# Patient Record
Sex: MALE | Race: WHITE | NOT HISPANIC OR LATINO | Employment: OTHER | URBAN - METROPOLITAN AREA
[De-identification: names, ages, dates, MRNs, and addresses within clinical notes are randomized per-mention and may not be internally consistent; named-entity substitution may affect disease eponyms.]

---

## 2018-04-10 ENCOUNTER — MEDICAL CORRESPONDENCE (OUTPATIENT)
Dept: HEALTH INFORMATION MANAGEMENT | Facility: CLINIC | Age: 73
End: 2018-04-10

## 2018-04-20 ENCOUNTER — PRE VISIT (OUTPATIENT)
Dept: NEUROLOGY | Facility: CLINIC | Age: 73
End: 2018-04-20

## 2018-04-20 NOTE — TELEPHONE ENCOUNTER
FUTURE VISIT INFORMATION      FUTURE VISIT INFORMATION:    Date: 5/2/18    Time: 4:30pm    [place] [facility] [place]  REFERRAL INFORMATION:    Referring provider:  Dr. Dong Encarnacion     Referring providers clinic:   [facility]    Reason for visit/diagnosis DBS consult for parkinsons    RECORDS REQUESTED FROM:       Clinic name Comments Records Status Imaging Status   [facility]  Received                                    RECORDS STATUS      4/20/18: referral

## 2018-04-27 ASSESSMENT — ENCOUNTER SYMPTOMS
LOSS OF CONSCIOUSNESS: 0
TINGLING: 1
NAIL CHANGES: 0
NUMBNESS: 0
DIZZINESS: 0
HEADACHES: 0
POOR WOUND HEALING: 0
PARALYSIS: 0
SEIZURES: 0
SPEECH CHANGE: 0
MEMORY LOSS: 0
SKIN CHANGES: 1
TREMORS: 1
DISTURBANCES IN COORDINATION: 0
WEAKNESS: 0

## 2018-05-01 NOTE — TELEPHONE ENCOUNTER
FUTURE VISIT INFORMATION        FUTURE VISIT INFORMATION:    Date: 05/02/2018      Time:     Location:   REFERRAL INFORMATION:    Referring provider:   Dr. Dong Encarnacion     Referring providers clinic:   [facility]    Reason for visit/diagnosis [facility]    Referring providers clinic:      Reason for visit/diagnosis      RECORDS REQUESTED FROM:       Clinic name Comments Records Status Imaging Status   [facility] Records Received 04/20/2018                                     RECORDS STATUS        NOTES (FOR ALL VISITS) STATUS DETAILS   OFFICE NOTE from referring provider Received    OFFICE NOTE from other specialist Received    DISCHARGE SUMMARY from hospital N/A    DISCHARGE REPORT from the ER N/A    OPERATIVE REPORT N/A    MEDICATION LIST Received     IMAGING  (FOR ALL VISITS)     EMG Received    EEG Received    ECT Received    MRI (HEAD, NECK, SPINE) N/A    CT (HEAD, NECK, SPINE) N/A

## 2018-05-02 ENCOUNTER — OFFICE VISIT (OUTPATIENT)
Dept: NEUROLOGY | Facility: CLINIC | Age: 73
End: 2018-05-02
Payer: COMMERCIAL

## 2018-05-02 ENCOUNTER — PRE VISIT (OUTPATIENT)
Dept: NEUROLOGY | Facility: CLINIC | Age: 73
End: 2018-05-02

## 2018-05-02 VITALS
DIASTOLIC BLOOD PRESSURE: 73 MMHG | HEART RATE: 60 BPM | HEIGHT: 68 IN | SYSTOLIC BLOOD PRESSURE: 147 MMHG | BODY MASS INDEX: 28.46 KG/M2 | WEIGHT: 187.8 LBS

## 2018-05-02 DIAGNOSIS — G20.A1 PARKINSON'S DISEASE (H): Primary | ICD-10-CM

## 2018-05-02 RX ORDER — CARBIDOPA AND LEVODOPA 25; 100 MG/1; MG/1
TABLET ORAL
Qty: 150 TABLET | Refills: 3 | Status: SHIPPED | OUTPATIENT
Start: 2018-05-02 | End: 2018-06-12

## 2018-05-02 RX ORDER — LABETALOL 100 MG/1
TABLET, FILM COATED ORAL
COMMUNITY
Start: 2017-12-12 | End: 2018-06-04

## 2018-05-02 ASSESSMENT — PAIN SCALES - GENERAL: PAINLEVEL: NO PAIN (0)

## 2018-05-02 NOTE — NURSING NOTE
Chief Complaint   Patient presents with     Consult     New movement- DBS Consultation     Nunu Landry

## 2018-05-02 NOTE — PATIENT INSTRUCTIONS
#1  Change carbidiopa/levodopa to 25/100 1 tab every 3 hours while awake  #2 Take one carbidopa/levodopa 50/200 at bedtime  #3 Start DBS clinic  #4 Attend DBS class  #5 Return to clinic

## 2018-05-02 NOTE — PROGRESS NOTES
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  Currently he is slightly under treated with his carbidopa levodopa 50/200 ER.  The normal practice would be to convert him to carbidopa levodopa 25/100 immediate release.  It is quite possible in doing so however he would experience motor fluctuations for the first time.  He is so mild that he really does not experience significant off on his current dosage.  I think it would be worthwhile to see what he can do on a regular carbidopa levodopa.  It would also help him experience a higher level of on.  Hopefully we can do this without causing dyskinesia.  I recommend that he take carbidopa/levodopa 25/100, 1 tablet every 3 hours while awake.  He should always take the medicine one half hour before meals.    #2  He is still very interested in deep brain stimulation.  He recognizes that he is older and he does not want to miss an opportunity to have the stimulation procedure.  He would like to begin the DBS workup and make a decision between medical treatment and surgical treatment at the completion of the workup.  I think this is quite reasonable.    Please call or write with questions or concerns,    Sincerely yours,    Meng Liriano MD      History of Present Illness  Mr. Antoine is a 72 year old male who is retired from information technology.  He comes with his wife.    There is no family history of Parkinson's disease or tremor.  The patient has no history of serious head injury.  He says he did have mercury exposure as a child.  15 years ago he was  exposed to Reglan and became exhausted and can barely move.  He was [place] on [place].  He is a coffee drinker.    His sense of smell remains good.  He has had longtime REM behavior disorder dating decades.    6 years ago he developed hand tremor.  It was mainly in the right handed and resting tremor.  Would also had some action components.  It now affects the left hand and the right foot.  He saw Dr. Díaz at the [facility].  Dr. Díaz diagnosed Parkinson's disease.  The patient was started on carbidopa levodopa and it was felt that it helped his tremor.  He has done well since.  He has had some slight hesitation when he walks but no clear freezing.  He has no festination.  His handwriting is small.  He does drool some into the pillow.  His speech is soft.  His walking is shuffling at times.  He is however had no falls no fainting and only minor difficulty with memory for names.    Currently he takes carbidopa/levodopa 50/200 long-acting 1 tablet 4 times a day.  He feels he may see a reduction in tremor 1 hour after taking a pill.  He does not notice any wearing off.  The medicine may decrease the tremor but it sounds as if he is never without tremor through the day.    He was in [place] with some friends.  2 of them had Parkinson's disease and had deep brain stimulation.  They recommended that he undergo the procedure.  At this is a reason for coming here today.        Past Medical History:   History reviewed. No pertinent past medical history.    Past Surgical History:   History reviewed. No pertinent surgical history.    Medications:  Current Outpatient Prescriptions   Medication Sig Dispense Refill     labetalol (NORMODYNE) 100 MG tablet        UNABLE TO FIND        AMLODIPINE BESYLATE PO Take 10 mg by mouth daily       carbidopa-levodopa (SINEMET CR)  MG per tablet Take 1 tablet by mouth 2 times daily       LOSARTAN POTASSIUM PO Take 100 mg by mouth daily       OMEPRAZOLE PO Take 20 mg by  mouth       potassium chloride (K-DUR) 10 MEQ tablet Take 10 mEq by mouth 2 times daily       triamterene-hydrochlorothiazide (MAXZIDE-25) 37.5-25 MG per tablet Take 1 tablet by mouth daily       UNABLE TO FIND 1 tablet daily MEDICATION NAME: Pam       [DISCONTINUED] carbidopa-levodopa (SINEMET CR)  MG per tablet Take 1 tablet by mouth 3 times daily            Movement Disorder-related Medications                   7 AM noon mc qhs    Carbidopa/levodopa 50/200 ER                                                1 1 1 1                                                                                Allergies: is allergic to metoclopramide and streptogramins.    Social History:   Social History     Social History     Marital status: Single     Spouse name: N/A     Number of children: N/A     Years of education: N/A     Social History Main Topics     Smoking status: Never Smoker     Smokeless tobacco: Never Used     Alcohol use None     Drug use: None     Sexual activity: Not Asked     Other Topics Concern     None     Social History Narrative       Family History:  History reviewed. No pertinent family history.    ROS:  General:  Fever : no, Chills: no, Sweats: no, Fatigue: no, ,Weight loss: no  Cardiovascular  Chest Pains: no, Palpitations: no, Edema: no, Shortness of breath: no  Respiratory  Cough: no  Gastrointestinal  Nausea: no, Vomiting: no, Diarrhea: no, Constipation: no  Genitourinary  Dysuria: no, Frequency: no, Urgency: no,  Nocturia: no, Incontinence: no Women:  Abnormal vaginal bleeding: no  Musculoskeletal  Back pain: no, Neck Pain: no  Joint pain, swelling, redness: no, Stiffness: no  Skin  Rash: no, Itching: no,  Suspicious lesions: no  Psychiatric  Depression: no, Anxiety/Panic: no  Endocrine  Cold intolerance: no, Heat intolerance: no, Excessive thirst: no  Hematologic/LymphatiAbnormal bruising, bleeding: no ,Enlarged lymph nodes: {.:[id number]  Allergic/Immunologic  Hives (Urticaria): no, HIV  exposure: no    Neurologic  Visual loss: no, Double vision: no, Headache: no, Loss of consciousness:  no, Seizure: no, Fainting (syncope): no, Dysarthria: no, Dysphagia:  no, Vertigo:  no, Weakness: no, Atrophy: no, Twitches: no, Lhermitte's: no, Numbness or tingling: no, Handwriting change: YES, Tremors: YES, Involuntary movements: no, Imbalance: no, Abnormal gait:  no, Falls :no, Memory loss: YES, RBD: YES, Sleep disorder: no, Hallucination: no, Loss of concentration: no,  Behavioral change: no,  Loss of motivation: no      Comprehensive Neurologic Exam    Mental Status Exam   The patient is alert, oriented and exhibits no difficulty with cognition or memory.  A formal short test of mental status was not performed. 32/34    Neurovascular         Speech and Language   Right Left     Carotid Bruit Absent Absent  Speech is normal.   Dorsalis Pedis Pulse Normal Normal  Description   Posterior Tibial Pulse Normal Normal  Language is normal.     Cranial Nerve Exam                 Right Left   Right Left   II                                        Normal Normal  Hearing (VIII) Normal Normal      III, IV, V!                   Normal Normal  Nystagmus (VIII) Normal Normal   EOM description                              Gag (IX, X) Normal Normal   Facial Sensation (V) Normal Normal  SCM (XI) Normal Normal   Muscles of Mastication (V) Normal Normal  Trapezius (XI) Normal Normal   Facial Strength (VII) Normal Normal  Tongue (XII) Normal Normal     Motor Exam  Strength (*/5 grading)  Muscle                  Right Left  Muscle Right Left   Frontalis                                           Normal Normal  Iliopsoas Normal    Normal          Orbicularis Oculi                     Normal Normal  Quadrideps Normal    Normal        Orbicularis Oris                         Normal Normal  Anterior Tibial Normal Normal      Deltoid Normal Normal  Gastrocnemius Normal    Normal   Biceps Normal Normal  Extensor Hallucis Longus Normal     Normal   Triceps Normal Normal  Toe Extensors Normal    Normal   EDC Normal Normal  Toe Flexor Normal    Normal   Finger Flexors Normal Normal  Other Normal Normal   First Dorsal Interosseous Normal Normal  Other Normal Normal   Hypothenar Normal Normal  Other Normal Normal   Thenar Normal Normal  Other Normal Normal     Reflexes      Tone   Right Left   Right Left   Biceps Normal Normal    Rigidity (R)     Triceps Normal Normal  Neck     Brachioradialis Normal Normal  Arm +2 +2   Quadriceps Normal Normal  Leg +1 +1   Ankle Normal Normal       Babkinski Flexor Flexor         AMR       Coordination   Right Left   Right Left   Fingers -2 -2  Finger nose finger Normal Normal   Hand -2 -1  Drift Normal Normal   Leg Normal Normal  Heel Meyer Normal Normal   Foot Normal Normal  Other     Other               Involuntary Movements    Gait and Station  None            Right Left  Stand & Sit -1   Tremor rest hands +3 +2  Gait -2   Tremor action hands +1 +1  Tandem Normal   (Movement type) Normal Normal  Romberg Absent       Sensory Exam          Right Left    Pin Normal Normal    Vibration Normal Normal    Joint position Normal Normal    Other             Coding statement:     Duration of  Services: face-to-face 80 min.   Greater than 50% of this visit was spent in counseling and coordination of care.    Medical decision making is high due to the following components:    Number of diagnoses:[id number]  ManagementDiagnostic tests orders or reviewed.  Medications were prescribed. Medications were adjusted.  Complexity of medical data:Outside records reviewed.    Took collateral history.  Risk  One or more chronic illnesses with severe exacerbation, progression, or side effects of treatment.

## 2018-05-02 NOTE — MR AVS SNAPSHOT
"              After Visit Summary   5/2/2018    Kingston Antoine    MRN: [id number]           Patient Information     Date Of Birth          1945        Visit Information        Provider Department      5/2/2018 4:30 PM Meng Liriano MD [facility] Neurology        Today's Diagnoses     Parkinson's disease (H)    -  1      Care Instructions    #1  Change carbidiopa/levodopa to 25/100 1 tab every 3 hours while awake  #2 Take one carbidopa/levodopa 50/200 at bedtime  #3 Start DBS clinic  #4 Attend DBS class  #5 Return to clinic            Follow-ups after your visit        Follow-up notes from your care team     Return in about 4 weeks (around 5/30/2018).      Who to contact     Please call your clinic at [phone] to:    Ask questions about your health    Make or cancel appointments    Discuss your medicines    Learn about your test results    Speak to your doctor            Additional Information About Your Visit        [employer] Information     [employer] gives you secure access to your electronic health record. If you see a primary care provider, you can also send messages to your care team and make appointments. If you have questions, please call your primary care clinic.  If you do not have a primary care provider, please call [phone] and they will assist you.      [employer] is an electronic gateway that provides easy, online access to your medical records. With [employer], you can request a clinic appointment, read your test results, renew a prescription or communicate with your care team.     To access your existing account, please contact your [facility] or call [phone] for assistance.        Care EveryWhere ID     This is your Care EveryWhere ID. This could be used by other organizations to access your [facility] medical records  [id number]        Your Vitals Were     Pulse Height BMI (Body Mass Index)             60 1.727 m (5' 8\") 28.55 kg/m2          Blood " Pressure from Last 3 Encounters:   05/02/18 147/73   01/27/16 114/72    Weight from Last 3 Encounters:   05/02/18 85.2 kg (187 lb 12.8 oz)   01/27/16 88.5 kg (195 lb)              Today, you had the following     No orders found for display         Today's Medication Changes          These changes are accurate as of 5/2/18  5:18 PM.  If you have any questions, ask your nurse or doctor.               These medicines have changed or have updated prescriptions.        Dose/Directions    * carbidopa-levodopa  MG per CR tablet   Commonly known as:  SINEMET CR   This may have changed:    - Another medication with the same name was added. Make sure you understand how and when to take each.  - Another medication with the same name was removed. Continue taking this medication, and follow the directions you see here.   Changed by:  Meng Liriano MD        Dose:  1 tablet   Take 1 tablet by mouth At Bedtime   Refills:  0       * carbidopa-levodopa  MG per tablet   Commonly known as:  SINEMET   This may have changed:  You were already taking a medication with the same name, and this prescription was added. Make sure you understand how and when to take each.   Used for:  Parkinson's disease (H)   Replaces:  carbidopa-levodopa  MG per CR tablet   Changed by:  Meng Liriano MD        Take one tablet every 3 hours while awake.   Quantity:  150 tablet   Refills:  3       * Notice:  This list has 2 medication(s) that are the same as other medications prescribed for you. Read the directions carefully, and ask your doctor or other care provider to review them with you.      Stop taking these medicines if you haven't already. Please contact your care team if you have questions.     aspirin 80 MG Supp Suppository   Stopped by:  Meng Liriano MD           atovaquone-proguanil 250-100 MG per tablet   Commonly known as:  MALARONE   Stopped by:  Meng Liriano MD           carbidopa-levodopa  MG per  CR tablet   Commonly known as:  SINEMET CR   Replaced by:  carbidopa-levodopa  MG per tablet   You also have another medication with the same name that you need to continue taking as instructed.   Stopped by:  Meng Liriano MD                Where to get your medicines      These medications were sent [id number] - [place], [place] - [address]  [address] [address]     Phone:  [phone]     carbidopa-levodopa  MG per tablet                Primary Care Provider Fax #    Physician No Ref-Primary [phone]       No address on file        Equal Access to Services     [facility]: Hadii bebo gaston hadbenny Somakenna, waaxda luqadaha, qaybta kaalmagerardo kelsey, amy medley . So [facility] [phone].    ATENCIÓN: Si habla español, tiene a manzanares disposición servicios gratuitos de asistencia lingüística. [facility] [phone].    We comply with applicable federal civil rights laws and [place] laws. We do not discriminate on the basis of race, color, national origin, age, disability, sex, sexual orientation, or gender identity.            Thank you!     Thank you for choosing [facility] NEUROLOGY  for your care. Our goal is always to provide you with excellent care. Hearing back from our patients is one way we can continue to improve our services. Please take a few minutes to complete the written survey that you may receive in the mail after your visit with us. Thank you!             Your Updated Medication List - Protect others around you: Learn how to safely use, store and throw away your medicines at www.disposemymeds.org.          This list is accurate as of 5/2/18  5:18 PM.  Always use your most recent med list.                   Brand Name Dispense Instructions for use Diagnosis    AMLODIPINE BESYLATE PO      Take 10 mg by mouth daily        * carbidopa-levodopa  MG per CR tablet    SINEMET CR     Take 1 tablet by mouth At Bedtime        *  carbidopa-levodopa  MG per tablet    SINEMET    150 tablet    Take one tablet every 3 hours while awake.    Parkinson's disease (H)       labetalol 100 MG tablet    NORMODYNE          LOSARTAN POTASSIUM PO      Take 100 mg by mouth daily        OMEPRAZOLE PO      Take 20 mg by mouth        potassium chloride 10 MEQ tablet    K-TAB,KLOR-CON     Take 10 mEq by mouth 2 times daily        triamterene-hydrochlorothiazide 37.5-25 MG per tablet    MAXZIDE-25     Take 1 tablet by mouth daily        UNABLE TO FIND      1 tablet daily MEDICATION NAME: Pam        UNABLE TO FIND           * Notice:  This list has 2 medication(s) that are the same as other medications prescribed for you. Read the directions carefully, and ask your doctor or other care provider to review them with you.

## 2018-05-02 NOTE — LETTER
2018       RE: Kingston Antoine  [address] [facility] [address]     Dear Colleague,    Thank you for referring your patient, Kingston Antoine, to the [facility] NEUROLOGY at [facility]. Please see a copy of my visit note below.    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  Currently he is slightly under treated with his carbidopa levodopa 50/200 ER.  The normal practice would be to convert him to carbidopa levodopa 25/100 immediate release.  It is quite possible in doing so however he would experience motor fluctuations for the first time.  He is so mild that he really does not experience significant off on his current dosage.  I think it would be worthwhile to see what he can do on a regular carbidopa levodopa.  It would also help him experience a higher level of on.  Hopefully we can do this without causing dyskinesia.  I recommend that he take carbidopa/levodopa 25/100, 1 tablet every 3 hours while awake.  He should always take the medicine one half hour before meals.    #2  He is still very interested in deep brain stimulation.  He recognizes that he is older and he does not want to miss an opportunity to have the stimulation procedure.  He would like to begin the DBS workup and make a decision between medical treatment and surgical treatment at the completion of the workup.  I think this is quite reasonable.    Please call or write with questions or concerns,    Sincerely yours,    Meng Liriano MD      History of Present Illness  Mr. 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  He comes with his wife.    There is no family history of Parkinson's disease or tremor.  The patient has no history of serious head injury.  He says he did have mercury exposure as a child.  15 years ago he was exposed to Reglan and became exhausted and can barely move.  He was [place] on [place].  He is a coffee drinker.    His sense of smell remains good.  He has had longtime REM behavior disorder dating decades.    6 years ago he developed hand tremor.  It was mainly in the right handed and resting tremor.  Would also had some action components.  It now affects the left hand and the right foot.  He saw Dr. Díaz at the [facility].  Dr. Díaz diagnosed Parkinson's disease.  The patient was started on carbidopa levodopa and it was felt that it helped his tremor.  He has done well since.  He has had some slight hesitation when he walks but no clear freezing.  He has no festination.  His handwriting is small.  He does drool some into the pillow.  His speech is soft.  His walking is shuffling at times.  He is however had no falls no fainting and only minor difficulty with memory for names.    Currently he takes carbidopa/levodopa 50/200 long-acting 1 tablet 4 times a day.  He feels he may see a reduction in tremor 1 hour after taking a pill.  He does not notice any wearing off.  The medicine may decrease the tremor but it sounds as if he is never without tremor through the day.    He was in [place] with some friends.  2 of them had Parkinson's disease and had deep brain stimulation.  They recommended that he undergo the procedure.  At this is a reason for coming here today.        Past Medical History:   History reviewed. No pertinent past medical history.    Past Surgical History:   History reviewed. No pertinent surgical history.    Medications:  Current Outpatient Prescriptions   Medication Sig Dispense Refill     labetalol (NORMODYNE) 100  MG tablet        UNABLE TO FIND        AMLODIPINE BESYLATE PO Take 10 mg by mouth daily       carbidopa-levodopa (SINEMET CR)  MG per tablet Take 1 tablet by mouth 2 times daily       LOSARTAN POTASSIUM PO Take 100 mg by mouth daily       OMEPRAZOLE PO Take 20 mg by mouth       potassium chloride (K-DUR) 10 MEQ tablet Take 10 mEq by mouth 2 times daily       triamterene-hydrochlorothiazide (MAXZIDE-25) 37.5-25 MG per tablet Take 1 tablet by mouth daily       UNABLE TO FIND 1 tablet daily MEDICATION NAME: Pam       [DISCONTINUED] carbidopa-levodopa (SINEMET CR)  MG per tablet Take 1 tablet by mouth 3 times daily            Movement Disorder-related Medications                   7 AM noon mc qhs    Carbidopa/levodopa 50/200 ER                                                1 1 1 1                                                                                Allergies: is allergic to metoclopramide and streptogramins.    Social History:   Social History     Social History     Marital status: Single     Spouse name: N/A     Number of children: N/A     Years of education: N/A     Social History Main Topics     Smoking status: Never Smoker     Smokeless tobacco: Never Used     Alcohol use None     Drug use: None     Sexual activity: Not Asked     Other Topics Concern     None     Social History Narrative       Family History:  History reviewed. No pertinent family history.    ROS:  General:  Fever : no, Chills: no, Sweats: no, Fatigue: no, ,Weight loss: no  Cardiovascular  Chest Pains: no, Palpitations: no, Edema: no, Shortness of breath: no  Respiratory  Cough: no  Gastrointestinal  Nausea: no, Vomiting: no, Diarrhea: no, Constipation: no  Genitourinary  Dysuria: no, Frequency: no, Urgency: no,  Nocturia: no, Incontinence: no Women:  Abnormal vaginal bleeding: no  Musculoskeletal  Back pain: no, Neck Pain: no  Joint pain, swelling, redness: no, Stiffness: no  Skin  Rash: no, Itching: no,  Suspicious  lesions: no  Psychiatric  Depression: no, Anxiety/Panic: no  Endocrine  Cold intolerance: no, Heat intolerance: no, Excessive thirst: no  Hematologic/LymphatiAbnormal bruising, bleeding: no ,Enlarged lymph nodes: {.:[id number]  Allergic/Immunologic  Hives (Urticaria): no, HIV exposure: no    Neurologic  Visual loss: no, Double vision: no, Headache: no, Loss of consciousness:  no, Seizure: no, Fainting (syncope): no, Dysarthria: no, Dysphagia:  no, Vertigo:  no, Weakness: no, Atrophy: no, Twitches: no, Lhermitte's: no, Numbness or tingling: no, Handwriting change: YES, Tremors: YES, Involuntary movements: no, Imbalance: no, Abnormal gait:  no, Falls :no, Memory loss: YES, RBD: YES, Sleep disorder: no, Hallucination: no, Loss of concentration: no,  Behavioral change: no,  Loss of motivation: no      Comprehensive Neurologic Exam    Mental Status Exam   The patient is alert, oriented and exhibits no difficulty with cognition or memory.  A formal short test of mental status was not performed. 32/34    Neurovascular         Speech and Language   Right Left     Carotid Bruit Absent Absent  Speech is normal.   Dorsalis Pedis Pulse Normal Normal  Description   Posterior Tibial Pulse Normal Normal  Language is normal.     Cranial Nerve Exam                 Right Left   Right Left   II                                        Normal Normal  Hearing (VIII) Normal Normal      III, IV, V!                   Normal Normal  Nystagmus (VIII) Normal Normal   EOM description                              Gag (IX, X) Normal Normal   Facial Sensation (V) Normal Normal  SCM (XI) Normal Normal   Muscles of Mastication (V) Normal Normal  Trapezius (XI) Normal Normal   Facial Strength (VII) Normal Normal  Tongue (XII) Normal Normal     Motor Exam  Strength (*/5 grading)  Muscle                  Right Left  Muscle Right Left   Frontalis                                           Normal Normal  Iliopsoas Normal    Normal          Orbicularis Oculi                      Normal Normal  Quadrideps Normal    Normal        Orbicularis Oris                         Normal Normal  Anterior Tibial Normal Normal      Deltoid Normal Normal  Gastrocnemius Normal    Normal   Biceps Normal Normal  Extensor Hallucis Longus Normal    Normal   Triceps Normal Normal  Toe Extensors Normal    Normal   EDC Normal Normal  Toe Flexor Normal    Normal   Finger Flexors Normal Normal  Other Normal Normal   First Dorsal Interosseous Normal Normal  Other Normal Normal   Hypothenar Normal Normal  Other Normal Normal   Thenar Normal Normal  Other Normal Normal     Reflexes      Tone   Right Left   Right Left   Biceps Normal Normal    Rigidity (R)     Triceps Normal Normal  Neck     Brachioradialis Normal Normal  Arm +2 +2   Quadriceps Normal Normal  Leg +1 +1   Ankle Normal Normal       Babkinski Flexor Flexor         AMR       Coordination   Right Left   Right Left   Fingers -2 -2  Finger nose finger Normal Normal   Hand -2 -1  Drift Normal Normal   Leg Normal Normal  Heel Meyer Normal Normal   Foot Normal Normal  Other     Other               Involuntary Movements    Gait and Station  None            Right Left  Stand & Sit -1   Tremor rest hands +3 +2  Gait -2   Tremor action hands +1 +1  Tandem Normal   (Movement type) Normal Normal  Romberg Absent       Sensory Exam          Right Left    Pin Normal Normal    Vibration Normal Normal    Joint position Normal Normal    Other             Coding statement:     Duration of  Services: face-to-face 80 min.   Greater than 50% of this visit was spent in counseling and coordination of care.    Medical decision making is high due to the following components:    Number of diagnoses:[id number]  ManagementDiagnostic tests orders or reviewed.  Medications were prescribed. Medications were adjusted.  Complexity of medical data:Outside records reviewed.    Took collateral history.  Risk  One or more chronic illnesses with severe exacerbation,  progression, or side effects of treatment.        Again, thank you for allowing me to participate in the care of your patient.      Sincerely,    Meng Liriano MD

## 2018-05-03 ENCOUNTER — TELEPHONE (OUTPATIENT)
Dept: NEUROSURGERY | Facility: CLINIC | Age: 73
End: 2018-05-03

## 2018-05-03 ENCOUNTER — TELEPHONE (OUTPATIENT)
Dept: NEUROLOGY | Facility: CLINIC | Age: 73
End: 2018-05-03

## 2018-05-03 NOTE — TELEPHONE ENCOUNTER
[facility] Call Center    Phone Message    May a detailed message be left on voicemail: yes    Reason for Call: Other: Pt trying to get a hold of Amanda Garvey RN regarding DBS class. Amanda documented that she left her direct number in a voicemail but pt was unable to find the number. Pt would like a call back.     Action Taken: Message routed to:  [facility] neurology

## 2018-05-04 ENCOUNTER — TELEPHONE (OUTPATIENT)
Dept: NEUROSURGERY | Facility: CLINIC | Age: 73
End: 2018-05-04

## 2018-05-04 DIAGNOSIS — G20.A1 PARKINSON'S DISEASE (H): Primary | ICD-10-CM

## 2018-05-04 NOTE — TELEPHONE ENCOUNTER
Spoke with pt about the 4 DBS workup appointments. Described each appointment as well as the DBS class. Patient would like to complete the workup as soon as possible and it is not a high priority for him to double up appointments if doing so will delay the workup.     He is planning an [place] trip either June 12-23 or June 24-30 and will be in [place] for a good part of July. However, he may be able to attend the July 18 class. I will add him to my July and August class schedules and he can choose which he would like to attend. I explained that attending in August won't impact his surgery date if he is a candidate.     Pt indicates he had an intestinal staple placed when he last had a colonoscopy. He thinks it was taken out shortly after the procedure, but said his GI MD will want him to have an xray to verify this prior to any MRI. He will f/u with the GI provider about this.     I will send pt a letter with proposed appt dates/times as well as class information and we will talk again next week. No further questions at this time.

## 2018-05-04 NOTE — TELEPHONE ENCOUNTER
[facility] Call Center    Phone Message    May a detailed message be left on voicemail: yes    Reason for Call: Other: Pt returning call. Please give him a call back, he said it may take him a minute to get to the phone.      Action Taken: Message routed to:  [facility] Neurology

## 2018-05-10 ENCOUNTER — TELEPHONE (OUTPATIENT)
Dept: NEUROSURGERY | Facility: CLINIC | Age: 73
End: 2018-05-10

## 2018-05-10 NOTE — TELEPHONE ENCOUNTER
"Pt is currently scheduled for DBS workup appointments. He and his partner were both on the line during the call. They wanted to know if the workup appointments are covered by insurance. I explained that I cannot answer specific questions about their responsibilities including co-payments and deductibles; I am happy to refer them to Joslyn Ramirez, our financial counselor. They would like to speak with her (I sent her a message and she in turn left a VM at the time of this note).     They had additional questions about the DBS class, which I answered. Pt's partner expressed concern that they are \"putting the cart before the horse\" in attending the class after the workup appointments are already complete. Unfortunately, I don't have any openings in the June class and they are unable to come until August. The class does cover the purpose of each workup appointment, but goes well beyond that, so I still think it will help them determine if DBS is a therapy that pt wants to pursue. They will also have the opportunity to ask questions about the procedure when pt meets with Dr. Gama.     Per their request, I sent a new letter re the August DBS class. I also included a flyer about the [facility] Parkinson's Symposium taking place on June 9.     Pt has my direct number and is welcome to call with any questions.   "

## 2018-05-11 ENCOUNTER — TELEPHONE (OUTPATIENT)
Dept: NEUROSURGERY | Facility: CLINIC | Age: 73
End: 2018-05-11

## 2018-05-11 ENCOUNTER — TRANSFERRED RECORDS (OUTPATIENT)
Dept: HEALTH INFORMATION MANAGEMENT | Facility: CLINIC | Age: 73
End: 2018-05-11

## 2018-05-11 NOTE — TELEPHONE ENCOUNTER
Pt called b/c he is scheduled for 3T brain MRI on 5/21/18. He had an abdominal xray to determine if a clip that was placed during his last colonoscopy is still there. He let me know that the clip is still there and he is now following up with the provider who did the colonoscopy to determine what material the clip is made of. He will keep me apprised. He has my fax number and my direct phone number. He will ask his providers to fax me any pertinent information. No further questions/concerns.

## 2018-05-14 ENCOUNTER — TELEPHONE (OUTPATIENT)
Dept: NEUROSURGERY | Facility: CLINIC | Age: 73
End: 2018-05-14

## 2018-05-14 NOTE — TELEPHONE ENCOUNTER
Report sent to scanning. Faxed to  MRI techs at [phone] and notified them by phone of pt's upcoming MRI in light of the report. Report indicates there is a metallic cholecystectomy clip in the abdomen.

## 2018-05-15 ENCOUNTER — OFFICE VISIT (OUTPATIENT)
Dept: NEUROPSYCHOLOGY | Facility: CLINIC | Age: 73
End: 2018-05-15
Payer: COMMERCIAL

## 2018-05-15 DIAGNOSIS — F09 MENTAL DISORDER DUE TO GENERAL MEDICAL CONDITION: ICD-10-CM

## 2018-05-15 DIAGNOSIS — G20.A1 PARKINSON'S DISEASE (H): Primary | ICD-10-CM

## 2018-05-15 NOTE — NURSING NOTE
The patient was seen for neuropsychological evaluation at the request of Dr. Meng Liriano for the purpose of diagnostic clarification and treatment planning.  3 hours of face to face testing were provided by this writer.  Please see Dr. Mackenzie Thornton's report for a full interpretation of the findings.

## 2018-05-15 NOTE — MR AVS SNAPSHOT
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 [place])    [address]  [address] [facility] [address] [phone]              Who to contact     Please call your clinic at [phone] to:    Ask questions about your health    Make or cancel appointments    Discuss your medicines    Learn about your test results    Speak to your doctor            Additional Information About Your Visit        MyChart Information     SNAPP' gives you secure access to your electronic health record. If you see a primary care provider, you can also send messages to your care team and make appointments. If you have questions, please call your primary care clinic.  If you do not have a primary care provider, please call [phone] and they will assist you.      SNAPP' is an electronic gateway that provides easy, online access to your medical records. With SNAPP', you can request a clinic appointment, read your test results, renew a prescription or communicate with your care team.     To access your existing account, please contact your [facility] or call [phone] for assistance.        Care EveryWhere ID     This is your Care EveryWhere ID. This could be used by other organizations to access your [place] medical records  [id number]         Blood Pressure from Last 3 Encounters:   05/21/18  121/62   05/02/18 147/73   01/27/16 114/72    Weight from Last 3 Encounters:   05/21/18 85.1 kg (187 lb 11.2 oz)   05/02/18 85.2 kg (187 lb 12.8 oz)   01/27/16 88.5 kg (195 lb)              We Performed the Following     [id number] TESTING, PER HR/PSYCHOLOGIST     NEUROPSYCH TESTING BY [facility]        Primary Care Provider Fax #    Physician No Ref-Primary [phone]       No address on file        Equal Access to Services     LENNOX WILBURN : Hadii aad ku hadasho Soomaali, waaxda luqadaha, qaybta kaalmada adeegyada, amy medley . So [facility] [phone].    ATENCIÓN: Si habla español, tiene a manzanares disposición servicios gratuitos de asistencia lingüística. Llame al [phone].    We comply with applicable federal civil rights laws and [place] laws. We do not discriminate on the basis of race, color, national origin, age, disability, sex, sexual orientation, or gender identity.            Thank you!     Thank you for choosing [facility] NEUROPSYCHOLOGY  for your care. Our goal is always to provide you with excellent care. Hearing back from our patients is one way we can continue to improve our services. Please take a few minutes to complete the written survey that you may receive in the mail after your visit with us. Thank you!             Your Updated Medication List - Protect others around you: Learn how to safely use, store and throw away your medicines at www.disposemymeds.org.          This list is accurate as of 5/15/18 11:59 PM.  Always use your most recent med list.                   Brand Name Dispense Instructions for use Diagnosis    AMLODIPINE BESYLATE PO      Take 10 mg by mouth daily        * carbidopa-levodopa  MG per CR tablet    SINEMET CR     Take 1 tablet by mouth At Bedtime        * carbidopa-levodopa  MG per tablet    SINEMET    150 tablet    Take one tablet every 3 hours while awake.    Parkinson's disease (H)       labetalol 100 MG tablet    NORMODYNE           LOSARTAN POTASSIUM PO      Take 100 mg by mouth daily        OMEPRAZOLE PO      Take 20 mg by mouth        potassium chloride 10 MEQ tablet    K-TAB,KLOR-CON     Take 10 mEq by mouth 2 times daily        triamterene-hydrochlorothiazide 37.5-25 MG per tablet    MAXZIDE-25     Take 1 tablet by mouth daily        UNABLE TO FIND      1 tablet daily MEDICATION NAME: Pam        UNABLE TO FIND           * Notice:  This list has 2 medication(s) that are the same as other medications prescribed for you. Read the directions carefully, and ask your doctor or other care provider to review them with you.

## 2018-05-21 ENCOUNTER — OFFICE VISIT (OUTPATIENT)
Dept: NEUROSURGERY | Facility: CLINIC | Age: 73
End: 2018-05-21
Payer: COMMERCIAL

## 2018-05-21 ENCOUNTER — RADIANT APPOINTMENT (OUTPATIENT)
Dept: MRI IMAGING | Facility: CLINIC | Age: 73
End: 2018-05-21
Attending: PSYCHIATRY & NEUROLOGY
Payer: COMMERCIAL

## 2018-05-21 VITALS
OXYGEN SATURATION: 96 % | SYSTOLIC BLOOD PRESSURE: 121 MMHG | HEIGHT: 68 IN | WEIGHT: 187.7 LBS | BODY MASS INDEX: 28.45 KG/M2 | HEART RATE: 63 BPM | DIASTOLIC BLOOD PRESSURE: 62 MMHG

## 2018-05-21 DIAGNOSIS — G20.A1 PARKINSON'S DISEASE (H): ICD-10-CM

## 2018-05-21 DIAGNOSIS — G20.A1 PARKINSON'S DISEASE (H): Primary | ICD-10-CM

## 2018-05-21 RX ORDER — GADOBUTROL 604.72 MG/ML
8.5 INJECTION INTRAVENOUS ONCE
Status: COMPLETED | OUTPATIENT
Start: 2018-05-21 | End: 2018-05-21

## 2018-05-21 RX ADMIN — GADOBUTROL 8.5 ML: 604.72 INJECTION INTRAVENOUS at 11:38

## 2018-05-21 ASSESSMENT — PAIN SCALES - GENERAL: PAINLEVEL: NO PAIN (0)

## 2018-05-21 NOTE — PROGRESS NOTES
.  HISTORY AND PHYSICAL EXAM    Chief Complaint   Patient presents with     Consult     Deep Brain Stimulation candidacy evaluation; Parkinson's disease       HISTORY OF PRESENT ILLNESS  We saw Mr. Kingston Antoine today in [facility]surgery [facility] as part of his work-up for Deep Brain Stimulation candidacy evaluation.  He is a 72 year old man with Parkinson s disease, diagnosed about six years ago, who developed tremor in his right hand about seven years ago.  His other symptoms are micrographia and constipation.  He also reported dream enactment behavior preceding symptom onset by 13 years.  He does not report any problems with balance or falls.  His walking has slowed down and he reports two episodes of gait freezing within the last couple of months in a narrow location.  He does not report dyskinesia.  This has since progressed and now involves his right foot and left hand, though his right hand remains more severe.  He is currently taking Sinemet every three hours.  He has had his neuropsychiatric evaluation with Dr. Thornton.  His goal for DBS is to manage his tremor.  He hopes that DBS can also help him with his gait.  He is concerned that if he waits for DBS until he really needs it, he may be too old to have the surgery.  He would like his right body tremor/symptoms addressed.  He is right hand dominant.      Past Medical History:   Diagnosis Date     CKD (chronic kidney disease) stage 3, GFR 30-59 ml/min      Diabetes type 2, controlled (H)      HTN (hypertension)      Migraine      Parkinson disease (H)        Past Surgical History:   Procedure Laterality Date     ------------OTHER-------------      anal fistula repair     ARTHROSCOPY KNEE       cholecystectomy       HERNIA REPAIR       TONSILLECTOMY & ADENOIDECTOMY       TUNA         History reviewed. No pertinent family history.    Social History     Social History     Marital status: Single     Spouse name: N/A     Number of children: N/A     Years of  education: N/A     Occupational History     Not on file.     Social History Main Topics     Smoking status: Never Smoker     Smokeless tobacco: Never Used     Alcohol use Not on file     Drug use: Not on file     Sexual activity: Not on file     Other Topics Concern     Not on file     Social History Narrative          Allergies   Allergen Reactions     Metoclopramide      Streptogramins      PN: streptomyacins     Streptomycin Hives     No Clinical Screening - See Comments Anxiety     Other reaction(s): Sedation       Current Outpatient Prescriptions   Medication     carbidopa-levodopa (SINEMET CR)  MG per tablet     triamterene-hydrochlorothiazide (MAXZIDE-25) 37.5-25 MG per tablet     [DISCONTINUED] carbidopa-levodopa (SINEMET)  MG per tablet     amLODIPine (NORVASC) 5 MG tablet     carbidopa-levodopa (SINEMET)  MG per tablet     fa-pyridoxine-cyancobalamin (FOLBIC) 2.5-25-2 MG TABS per tablet     labetalol (NORMODYNE) 100 MG tablet     losartan (COZAAR) 100 MG tablet     omeprazole (PRILOSEC) 20 MG CR capsule     potassium chloride (K-TAB,KLOR-CON) 10 MEQ tablet     triamcinolone (KENALOG) 0.5 % cream     No current facility-administered medications for this visit.        REVIEW OF SYSTEMS:  General: Negative for chills/sweats/fever, difficulty sleeping, headache, recent fatigue, or weight gain/loss.  Eyes: Negative for blurred vision, crossed eyes, double vision, recent eye infections, vision flashes, or vision halos.  Ears/Nose/Mouth/Throat: POSITIVE for ear discharge and hoarseness. Negative for bleeding gums, difficulty swallowing, earache, ear discharge, hearing loss, hoarseness, nosebleeds, tinnitus, or sinus problems.  Respiratory:Negative for chronic cough, coughing blood, night sweats, shortness of breath, Tuberculosis, or wheezing.  Cardiovascular: Negative for chest pain, dyspnea at night, heart murmur, palpitations, pacemaker, pacemaker, poor circulation, swollen legs/feet, or  "varicose veins.  Gastrointestinal: Negative for melena, hematochezia, chronic diarrhea, heartburn, Hepatitis A/B/C, increasing constipation, Liver Disease, nausea, or vomiting.   Genitourinary: Negative for Urinary retention, genital discharge, urinary incontinence, prostate problems, urgency, or UTI.   Neurological: POSITIVE for tingling in his hands. Negative for syncope, headaches, numbness of arms/legs, tingling in legs, memory problems, or seizures.  Psychological: Negative for anxiety, depression, panic attacks, or restlessness.  Skin: Negative for chronic skin itching, color changes in hand/feet when cold, poor scarring, non-healing ulcers, skin rashes/hives, unusual moles.  Musculoskeletal: Negative for arthritis, joint swelling in hands/wrists/hips/knees/joints, muscle tenderness in arms/legs, or osteoporosis.  Endocrine: POSITIVE for loss of libido. Negative for excessive thirst/hunger, intolerance for warm rooms, multiple broken bones, rapid weight gain/loss, galactorrhea, or thyroid issues.  Hematologic/Lymphatic: Negative for easy skin bruising, significant fatigue, prolonged bleeding, tender glands/lymph nodes.  Allergies: Negative for asthma or hay fever.      PHYSICAL EXAM  /62 (BP Location: Left arm, Patient Position: Sitting, Cuff Size: Adult Regular)  Pulse 63  Ht 1.727 m (5' 8\")  Wt 85.1 kg (187 lb 11.2 oz)  SpO2 96%  BMI 28.54 kg/m2    General: Awake, alert, oriented. Well nourished, well developed, no acute distress.  HEENT: Head normocephalic, atraumatic. No carotid bruit. Neck supple. Good range of motion. No palpable thyroid mass.  Heart: Regular rhythm and rate. No murmurs.  Lungs: Clear to auscultation and percussion bilaterally. No rhonchi, rales or wheeze.  Abdomen: Soft, non-tender, non-distended. No hepatosplenomegaly.  Extremity: Warm with no clubbing or cyanosis. No lower extremity edema.    Neurological:  Awake, alert and oriented to date, time, place and person. Speech " fluent.   Pupils equal, round, reactive to light.  Extraocular movement intact.  Visual fields are full on confrontation.  Hearing is grossly normal to finger rub. Bilateral hearing aids.    Facial sensation intact.  Face symmetric.  Tongue midline.  Uvula elevates equally.    Motor: full strength throughout.  Sensation: intact to light touch and pinpoint.  Deep tendon reflexes: Trace throughout. Negative for clonus. Negative for Cade's sign. No dysmetria.    Resting tremor in bilateral hands, right worse than left.      IMAGING  MRI brain without/with gadolinium 5/21/2018.  The cerebral atrophy with subdural space increase is noted.  His ventricles are moderate in size.    Impression:   1. Mild diffuse generalized cerebral and cerebellar atrophy.  2. Nonspecific scattered T2 hyperintensities. These may represent simply age-related changes versus sequela of mild chronic small vessel ischemic disease.  3. 2 punctate tiny foci of microhemorrhages in the subcortical white matter.        ASSESSMENT   72 year old right handed male with a history of Parkinson's disease.  Right hand tremor is the worst.    His neuropsych evaluation was completed last week with Dr. Thornton and he had his MRI of the brain today.  His MRI of the brain is somewhat concerning for atrophy and moderate size ventricles.  Targeting may be difficult due to the ventricle size and atrophy will increase the risk of hemorrhage and brain shift during surgery.    He is still scheduled to undergo ON/OFF testing.  Once he undergoes ON/OFF motor testing, his case will be ready to be discussed.    With regard to surgery scheduling, should he be considered a candidate for DBS, he and his wife are planning a vacation in [place] from February-March.  He would like his surgery to be done in a time-frame that would allow for his final programming to be complete by February.     He had detailed and numerous questions regarding DBS, devices and the surgical  procedure.  We spent additional time beyond the scheduled clinic visit to discuss all of his questions and concerns.    During today's visit, we discussed both phase I and II of DBS surgery.  We discussed that during Phase I, we would place the DBS electrode on the left side under MAC and microelectrode recording.  He would then return one week later for the phase II with placement of the DBS generator/battery over the left chest wall under general anesthesia.  If he is a unilateral candidate or wait and see strategy candidate, then he will undergo another evaluation/discussion prior to proceeding to the right side implantation.  If he is a bilateral candidate in a staged fashion, he would return three weeks later for the phase I and II combined for the placement of the DBS electrode on the right side under MAC and microelectrode recording followed by connection to the previously implanted generator/battery.     Briefly, following topic was discussed.    [employer]  MRI compatible.  Non-rechargeable and rechargeable generator/battery available.  4 contact electrode.  Communication method: have the  or patient controller on the skin directly over the generator/battery.    [employer]  Not yet MRI compatible.  Will become MRI compatible with retro-approval when FDA approves the device for MRI use.  Non-rechargeable generator/battery.  9 contact segmented/directional electrode.  Communication method: Wireless/bluetooth.    [employer]  Not MRI compatible.  Working on generator/battery that will be MRI compatible.  If patient gets an implant and needs an MRI in the future, when the device becomes available, patient can have the upgrade.  Rechargeable generator/battery.  8 contact electrode.  Independent current control at each contacts.  Communication method: Wireless.    I did discuss that the implant technique for these devices are relatively the same which was discussed again.  They all have similar risks  associated with the surgery.     Risks, benefits, alternative therapies were discussed with the patient, including but not limited to infection and bleeding (intracranial), injury to the brain, stroke, death, hardware failure and possible need for more surgeries.  Surgical procedure was discussed in detail.  I also discussed possible use of ÁNGEL for neuronavigation.  All questions were answered, and he expressed understanding.     A full history and physical exam was performed during today's visit.  His case will be discussed at the Movement Disorder Consensus Group Meeting to determine whether he is a good candidate for DBS surgery.      PLAN:  1. We will discuss his case at the Movement Disorder Consensus Group Meeting, and we will contact him regarding his DBS candidacy.       I, Vinod Byrnes, am serving as a scribe to document services personally performed by Froilan Gama MD, PhD, based upon my observations and the provider's statements to me. All documentation has been reviewed and edited by the aforementioned doctor prior to being entered into the official medical record.    I, Froilan Gama, attest that above named individual is acting in scribe capacity, has observed my performance of the services and has documented them in accordance with my direction. The documentation recorded by the scribe accurately reflects the service I personally performed and the decisions made by me. The document was also partially recorded by me and the entire document was edited by me as well.       120 minutes were spent face to face with the patient of which more than 50% of the time was spent counseling and discussing the above issues regarding diagnosis, surgical and device options, and steps for further evaluation.

## 2018-05-21 NOTE — LETTER
5/21/2018       RE: Kingston Antoine  [address] [facility] [id number]     Dear Colleague,    Thank you for referring your patient, Kingston Antoine, to the [facility] at [facility]. Please see a copy of my visit note below.    .  HISTORY AND PHYSICAL EXAM    Chief Complaint   Patient presents with     Consult     Deep Brain Stimulation candidacy evaluation; Parkinson's disease       HISTORY OF PRESENT ILLNESS  We saw Mr. Kingston Antoine today in Neurosurgery Clinic as part of his work-up for Deep Brain Stimulation candidacy evaluation.  He is a 72 year old man with Parkinson s disease, diagnosed about six years ago, who developed tremor in his right hand about seven years ago.  His other symptoms are micrographia and constipation.  He also reported dream enactment behavior preceding symptom onset by 13 years.  He does not report any problems with balance or falls.  His walking has slowed down and he reports two episodes of gait freezing within the last couple of months in a narrow location.  He does not report dyskinesia.  This has since progressed and now involves his right foot and left hand, though his right hand remains more severe.  He is currently taking Sinemet every three hours.  He has had his neuropsychiatric evaluation with Dr. Thornton.  His goal for DBS is to manage his tremor.  He hopes that DBS can also help him with his gait.  He is concerned that if he waits for DBS until he really needs it, he may be too old to have the surgery.  He would like his right body tremor/symptoms addressed.  He is right hand dominant.      Past Medical History:   Diagnosis Date     CKD (chronic kidney disease) stage 3, GFR 30-59 ml/min      Diabetes type 2, controlled (H)      HTN (hypertension)      Migraine      Parkinson disease (H)        Past Surgical History:   Procedure Laterality Date     ------------OTHER-------------      anal fistula repair     ARTHROSCOPY  KNEE       cholecystectomy       HERNIA REPAIR       TONSILLECTOMY & ADENOIDECTOMY       TUNA         History reviewed. No pertinent family history.    Social History     Social History     Marital status: Single     Spouse name: N/A     Number of children: N/A     Years of education: N/A     Occupational History     Not on file.     Social History Main Topics     Smoking status: Never Smoker     Smokeless tobacco: Never Used     Alcohol use Not on file     Drug use: Not on file     Sexual activity: Not on file     Other Topics Concern     Not on file     Social History Narrative          Allergies   Allergen Reactions     Metoclopramide      Streptogramins      PN: streptomyacins     Streptomycin Hives     No Clinical Screening - See Comments Anxiety     Other reaction(s): Sedation       Current Outpatient Prescriptions   Medication     carbidopa-levodopa (SINEMET CR)  MG per tablet     triamterene-hydrochlorothiazide (MAXZIDE-25) 37.5-25 MG per tablet     [DISCONTINUED] carbidopa-levodopa (SINEMET)  MG per tablet     amLODIPine (NORVASC) 5 MG tablet     carbidopa-levodopa (SINEMET)  MG per tablet     fa-pyridoxine-cyancobalamin (FOLBIC) 2.5-25-2 MG TABS per tablet     labetalol (NORMODYNE) 100 MG tablet     losartan (COZAAR) 100 MG tablet     omeprazole (PRILOSEC) 20 MG CR capsule     potassium chloride (K-TAB,KLOR-CON) 10 MEQ tablet     triamcinolone (KENALOG) 0.5 % cream     No current facility-administered medications for this visit.        REVIEW OF SYSTEMS:  General: Negative for chills/sweats/fever, difficulty sleeping, headache, recent fatigue, or weight gain/loss.  Eyes: Negative for blurred vision, crossed eyes, double vision, recent eye infections, vision flashes, or vision halos.  Ears/Nose/Mouth/Throat: POSITIVE for ear discharge and hoarseness. Negative for bleeding gums, difficulty swallowing, earache, ear discharge, hearing loss, hoarseness, nosebleeds, tinnitus, or sinus  "problems.  Respiratory:Negative for chronic cough, coughing blood, night sweats, shortness of breath, Tuberculosis, or wheezing.  Cardiovascular: Negative for chest pain, dyspnea at night, heart murmur, palpitations, pacemaker, pacemaker, poor circulation, swollen legs/feet, or varicose veins.  Gastrointestinal: Negative for melena, hematochezia, chronic diarrhea, heartburn, Hepatitis A/B/C, increasing constipation, Liver Disease, nausea, or vomiting.   Genitourinary: Negative for Urinary retention, genital discharge, urinary incontinence, prostate problems, urgency, or UTI.   Neurological: POSITIVE for tingling in his hands. Negative for syncope, headaches, numbness of arms/legs, tingling in legs, memory problems, or seizures.  Psychological: Negative for anxiety, depression, panic attacks, or restlessness.  Skin: Negative for chronic skin itching, color changes in hand/feet when cold, poor scarring, non-healing ulcers, skin rashes/hives, unusual moles.  Musculoskeletal: Negative for arthritis, joint swelling in hands/wrists/hips/knees/joints, muscle tenderness in arms/legs, or osteoporosis.  Endocrine: POSITIVE for loss of libido. Negative for excessive thirst/hunger, intolerance for warm rooms, multiple broken bones, rapid weight gain/loss, galactorrhea, or thyroid issues.  Hematologic/Lymphatic: Negative for easy skin bruising, significant fatigue, prolonged bleeding, tender glands/lymph nodes.  Allergies: Negative for asthma or hay fever.      PHYSICAL EXAM  /62 (BP Location: Left arm, Patient Position: Sitting, Cuff Size: Adult Regular)  Pulse 63  Ht 1.727 m (5' 8\")  Wt 85.1 kg (187 lb 11.2 oz)  SpO2 96%  BMI 28.54 kg/m2    General: Awake, alert, oriented. Well nourished, well developed, no acute distress.  HEENT: Head normocephalic, atraumatic. No carotid bruit. Neck supple. Good range of motion. No palpable thyroid mass.  Heart: Regular rhythm and rate. No murmurs.  Lungs: Clear to auscultation " and percussion bilaterally. No rhonchi, rales or wheeze.  Abdomen: Soft, non-tender, non-distended. No hepatosplenomegaly.  Extremity: Warm with no clubbing or cyanosis. No lower extremity edema.    Neurological:  Awake, alert and oriented to date, time, place and person. Speech fluent.   Pupils equal, round, reactive to light.  Extraocular movement intact.  Visual fields are full on confrontation.  Hearing is grossly normal to finger rub. Bilateral hearing aids.    Facial sensation intact.  Face symmetric.  Tongue midline.  Uvula elevates equally.    Motor: full strength throughout.  Sensation: intact to light touch and pinpoint.  Deep tendon reflexes: Trace throughout. Negative for clonus. Negative for Cade's sign. No dysmetria.    Resting tremor in bilateral hands, right worse than left.      IMAGING  MRI brain without/with gadolinium 5/21/2018.  The cerebral atrophy with subdural space increase is noted.  His ventricles are moderate in size.    Impression:   1. Mild diffuse generalized cerebral and cerebellar atrophy.  2. Nonspecific scattered T2 hyperintensities. These may represent simply age-related changes versus sequela of mild chronic small vessel ischemic disease.  3. 2 punctate tiny foci of microhemorrhages in the subcortical white matter.        ASSESSMENT   72 year old right handed male with a history of Parkinson's disease.  Right hand tremor is the worst.    His neuropsych evaluation was completed last week with Dr. Thornton and he had his MRI of the brain today.  His MRI of the brain is somewhat concerning for atrophy and moderate size ventricles.  Targeting may be difficult due to the ventricle size and atrophy will increase the risk of hemorrhage and brain shift during surgery.    He is still scheduled to undergo ON/OFF testing.  Once he undergoes ON/OFF motor testing, his case will be ready to be discussed.    With regard to surgery scheduling, should he be considered a candidate for DBS, he and  his wife are planning a vacation in [place] from February-March.  He would like his surgery to be done in a time-frame that would allow for his final programming to be complete by February.     He had detailed and numerous questions regarding DBS, devices and the surgical procedure.  We spent additional time beyond the scheduled clinic visit to discuss all of his questions and concerns.    During today's visit, we discussed both phase I and II of DBS surgery.  We discussed that during Phase I, we would place the DBS electrode on the left side under MAC and microelectrode recording.  He would then return one week later for the phase II with placement of the DBS generator/battery over the left chest wall under general anesthesia.  If he is a unilateral candidate or wait and see strategy candidate, then he will undergo another evaluation/discussion prior to proceeding to the right side implantation.  If  he is a bilateral candidate in a staged fashion, he would return three weeks later for the phase I and II combined for the placement of the DBS electrode on the right side under MAC and microelectrode recording followed by connection to the previously implanted generator/battery.     Briefly, following topic was discussed.    [employer]  MRI compatible.  Non-rechargeable and rechargeable generator/battery available.  4 contact electrode.  Communication method: have the  or patient controller on the skin directly over the generator/battery.    [employer]  Not yet MRI compatible.  Will become MRI compatible with retro-approval when FDA approves the device for MRI use.  Non-rechargeable generator/battery.  9 contact segmented/directional electrode.  Communication method: Wireless/bluetooth.    [employer]  Not MRI compatible.  Working on generator/battery that will be MRI compatible.  If patient gets an implant and needs an MRI in the future, when the device becomes available, patient can have the  upgrade.  Rechargeable generator/battery.  8 contact electrode.  Independent current control at each contacts.  Communication method: Wireless.    I did discuss that the implant technique for these devices are relatively the same which was discussed again.  They all have similar risks associated with the surgery.     Risks, benefits, alternative therapies were discussed with the patient, including but not limited to infection and bleeding (intracranial), injury to the brain, stroke, death, hardware failure and possible need for more surgeries.  Surgical procedure was discussed in detail.  I also discussed possible use of ÁNGEL for neuronavigation.  All questions were answered, and he expressed understanding.     A full history and physical exam was performed during today's visit.  His case will be discussed at the Movement Disorder Consensus Group Meeting to determine whether he is a good candidate for DBS surgery.      PLAN:  1. We will discuss his case at the Movement Disorder Consensus Group Meeting, and we will contact him regarding his DBS candidacy.       I, Vinod Byrnes, am serving as a scribe to document services personally performed by Froilan Gama MD, PhD, based upon my observations and the provider's statements to me. All documentation has been reviewed and edited by the aforementioned doctor prior to being entered into the official medical record.    I, Froilan Gama, attest that above named individual is acting in scribe capacity, has observed my performance of the services and has documented them in accordance with my direction. The documentation recorded by the scribe accurately reflects the service I personally performed and the decisions made by me. The document was also partially recorded by me and the entire document was edited by me as well.       120 minutes were spent face to face with the patient of which more than 50% of the time was spent counseling and discussing the above issues  regarding diagnosis, surgical and device options, and steps for further evaluation.    Again, thank you for allowing me to participate in the care of your patient.      Sincerely,    Froilan Gama MD

## 2018-05-21 NOTE — MR AVS SNAPSHOT
"              After Visit Summary   5/21/2018    Kingston Antoine    MRN: [id number]           Patient Information     Date Of Birth          1945        Visit Information        Provider Department      5/21/2018 2:00 PM Froilan Gama MD [facility] Neurosurgery        Today's Diagnoses     Parkinson's disease (H)    -  1       Follow-ups after your visit        Your next 10 appointments already scheduled     Jun 28, 2018   Procedure with Delroy Catherine MD   [facility] (--)    [address], [address]  [facility] [address] [phone]              Who to contact     Please call your clinic at [phone] to:    Ask questions about your health    Make or cancel appointments    Discuss your medicines    Learn about your test results    Speak to your doctor            Additional Information About Your Visit        MyChart Information     Commutable gives you secure access to your electronic health record. If you see a primary care provider, you can also send messages to your care team and make appointments. If you have questions, please call your primary care clinic.  If you do not have a primary care provider, please call [phone] and they will assist you.      Commutable is an electronic gateway that provides easy, online access to your medical records. With Commutable, you can request a clinic appointment, read your test results, renew a prescription or communicate with your care team.     To access your existing account, please contact your [facility] or call [phone] for assistance.        Care EveryWhere ID     This is your Care EveryWhere ID. This could be used by other organizations to access your [place] medical records  [id number]        Your Vitals Were     Pulse Height Pulse Oximetry BMI (Body Mass Index)          63 1.727 m (5' 8\") 96% 28.54 kg/m2         Blood Pressure from Last 3 Encounters:   06/04/18 133/83   05/21/18 121/62 "   05/02/18 147/73    Weight from Last 3 Encounters:   06/04/18 84.8 kg (187 lb)   05/21/18 85.1 kg (187 lb 11.2 oz)   05/02/18 85.2 kg (187 lb 12.8 oz)              Today, you had the following     No orders found for display       Primary Care Provider Fax #    Physician No Ref-Primary [phone]       [facility] [address]  [facility] [address]        Equal Access to Services     LENNOX WILBURN : Hadii aad ku hadasho Soomaali, waaxda luqadaha, qaybta kaalmada adeegyada, waxay idiin hayaan adeeg madysonaraalexis lamichelle . So [facility] [phone].    ATENCIÓN: Si habla español, tiene a manzanares disposición servicios gratuitos de asistencia lingüística. [facility] [phone].    We comply with applicable federal civil rights laws and [place] laws. We do not discriminate on the basis of race, color, national origin, age, disability, sex, sexual orientation, or gender identity.            Thank you!     Thank you for choosing [facility]  for your care. Our goal is always to provide you with excellent care. Hearing back from our patients is one way we can continue to improve our services. Please take a few minutes to complete the written survey that you may receive in the mail after your visit with us. Thank you!             Your Updated Medication List - Protect others around you: Learn how to safely use, store and throw away your medicines at www.disposemymeds.org.          This list is accurate as of 5/21/18 11:59 PM.  Always use your most recent med list.                   Brand Name Dispense Instructions for use Diagnosis    carbidopa-levodopa  MG per CR tablet    SINEMET CR     Take 1 tablet by mouth At Bedtime        triamterene-hydrochlorothiazide 37.5-25 MG per tablet    MAXZIDE-25     Take 1 tablet by mouth daily

## 2018-05-30 NOTE — PROGRESS NOTES
NAME: Kingston Antoine  MR#: [id number]  YOB: 1945  DATE OF EXAM: 5/15/2018    Neuropsychology Laboratory  [facility]  [address]  [place]  [address] [phone]    NEUROPSYCHOLOGICAL EVALUATION    RELEVANT HISTORY AND REASON FOR REFERRAL    Kingston Antoine is a 72 year old, right handed, retired  with 18 years of formal education. Information was obtained via interview with the patient and review of his medical records. Records from a neurology visit on 5/2/2018 indicated that he developed a right-handed resting tremor six years ago, which also had some action components. It now affects his left hand and right foot. 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 He has been diagnosed with Parkinson s disease. He has had some benefit from carbidopa levodopa. He has reportedly had mild memory insufficiencies. After talking with friends who have undergone the procedure, he is interested in deep brain stimulation (DBS) surgery for management of his symptoms. This neuropsychological evaluation was undertaken at the request of Meng Liriano M.D., to assess cognitive functioning and mood, as they pertain to his surgical candidacy, and to establish a neurocognitive baseline.    CLINICAL INTERVIEW FINDINGS    Upon interview, Mr. Antoine stated that he was diagnosed with Parkinson s disease six years ago. His first symptoms were shaking in his hands, which varied between his right hand and his left hand. Now he thinks that his right hand may be worse. Tremor remains his most bothersome symptoms. His goals for surgery including addressing the shaking, and possibly helping with his posture, which is stooped. He has a good understanding of the surgical procedure. He stated that he understands that there is a 1-2% risk of complications with each lead, including a risk of death and infection. He understands that he system can be  removed if necessary. He feels confident about being awake during the surgery, stating that he is scuba certified, and that he is fine with a diving helmet so he thinks he will do well during the surgery. He lives with his partner, who will be able to assist with his cares as necessary following the procedure. He described her as supportive of him undergoing surgery.    Mr. Antoine has noticed lifelong distractibility and difficulty with organization, but denied any changes in cognition, including memory, word finding, comprehension, or decision making. He manages his own finances and driving, apparently without difficulty. He manages his own medications, using a chart for the carbidopa-levodopa. He notices that his shaking increases during the period of time just after he takes the medication. He handles his personal cares independently.     Mr. Antoine denied any history of psychiatric illness. He participated in marriage counseling with his first wife. He has never been hospitalized for psychiatric care. When asked to describe his mood, he stated that the most upsetting things recently are his medical issues. He sometimes feels down for a few hours, but does not feel depressed, sad, hopeless, or helpless. When he was in [place] recently and staying at a bed and breakfast, he felt guilty being served because he was not working, but otherwise has not had significant feelings of guilt. He has not felt particularly anxious, other than about his medical issues. He is no more irritable than normal and has not been crying more than normal.  He falls asleep very easily, but then tends to toss and turn, and has academic arguments in his sleep. He acts out his dreams. Thirteen years ago, he thought someone was attacking him, and he flipped out of bed and down on all fours. His partner has to sleep in a separate room because of his movements. He sleeps about six hours a night, and may nap for 30 minutes in the afternoon. His  appetite has been good. He has gained three pounds in the last year. His energy level is generally good. He walks around [place] or [place] three times a week, and stated that he is slowing down a little. His interest level is good. He remains very busy, stating that he takes a lot of classes, and he enjoys going to movies and concerts. He denied visual or auditory hallucinations. He denied suicidal ideation or any history of attempts to commit suicide.     Mr. Antoine has 1-2 alcoholic beverages a day, usually red wine or beer. He has never been in any chemical dependency treatment programs, and has never been hospitalized for any alcohol related problems. He never has six or more drinks on one occasion. He denied illicit drug use. He tried marijuana twice, when he was in [place] and [place]. The first time, it helped with his shaking. The second time, he fell asleep, and does not know if it helped his tremor. He denied tobacco or e-cigarette use. He denied gambling. On a questionnaire, he endorsed occasional compulsive eating.     In school, Mr. Antoine had some difficulty with reading and writing, and was held back in the 3rd grade. He also stuttered, and had therapy for that. Ultimately, he completed his Master s degree in Urban Studies. 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 He retired around the age of 64. He was together with his first wife for 17 years. He has been together with his current partner for 20 years. He has one child.    When he was younger, Mr. Antoine was playing football and hit a lamppost. He stated that he did not pass out. He denied any history of seizure or stroke. His balance is generally good, although it is worse on his right side. He does Richard Chi, which he thinks helps. He has migraine headaches less than twice a year, that last less than 30 minutes. When they start, he notices  that the right side of his significant other s face seems like it is gone, and his vision seems quivery.     PAST MEDICAL HISTORY: Medical records indicate a history of Parkinson s disease.    CURRENT MEDICATIONS:  Include amlodipine, carbidopa-levodopa, labetalol, losartan, omeprazole, potassium chloride, triamterene-hydrochlorothiazide.    FAMILY MEDICAL HISTORY:  Significant for a father who had a cardiac arrest at 82, and lived to 89. His mother was forgetful. He does not have a known family history of Parkinson s disease.    BEHAVIORAL OBSERVATIONS    During the evaluation, Mr. Antoine was pleasant, cooperative, and seemed to understand the instructions. He was alert and oriented to  person, place and time. Mood was euthymic. Tremors were observed clinically, in both hands. Gait was slow. Speech was fluent, with normal articulation, volume and rate. Spontaneous conversation was present and appropriate. Internal performance validity measures fell within normal limits. The results are believed to accurately reflect his current level of functioning.    MEASURES ADMINISTERED    The following measures were administered by a trained psychometrist, under the direct supervision of a licensed psychologist.    Subtests of the Wechsler Adult Intelligence Scale-4; Reading subtest of the Wide Range Achievement Test-4; subtests of the Wechsler Memory Scale-Revised; Allred Verbal Learning Test-Revised, Form 4; Brief Visual Memory Test - Revised, Form 5; Morley Naming Test; D-KEFS Verbal Fluency, Alternate Form; Trail Making Test; Stroop; Wisconsin Card Sorting Test - 64; Swartz Judgement of Line Orientation Form H; Clock Drawing; Dementia Rating Scale - 2 (DRS-2) Alternate Form; Delong Depression Inventory-2 (BDI-2), HAM-D, HAM-A, QUIP, PDQ-39, ESS.     RESULTS AND INTERPRETATION    Overall intellectual functioning was estimated to fall in the above average range, above premorbid estimates of average based on single word  reading abilities, but consistent with his level of educational and occupational attainment. Performance on a screening measure of dementia was high average (DRS-2 Total Score = 141/144).     Confrontation naming was average for his age and level of education. Verbal abstract reasoning was average for his age. Ability to comprehend and articulate responses to complex social situations was above average. Letter fluency and generative naming to category were high average for his age. Switching fluency was low average.     Attention span was superior for his age. Divided attention was low average on a timed, visuomotor sequencing task, and was high average on an untimed, auditory sequencing task. Performance on a measure of cognitive flexibility and speed was low average. Psychomotor processing speed was low average.     Basic visual perception, including matching lines and angles, was high average for his age and level of education. Construction of a clock fell within normal limits, although he initially started to draw the clock upside down. Nonverbal deductive reasoning was average.    Novel problem solving, including the ability to generate strategies and solutions, fell within normal limits for his age and level of education.    Immediate recall of verbal narrative material was high average, with average recall following a 30 minute delay. On a multiple trial list learning task, immediate recall was high average. Recall following a 25 minute delay was average, with average recognition memory on this task. Immediate recall of visual material was average, with mildly impaired retention (67%) following a 25 minute delay. Recognition memory on this task fell within normal limits.    On the BDI-2, a self-report questionnaire, Mr. Antoine denied experiencing significant depressive symptomatology. Specifically, he acknowledged only that he feels he has failed more than he should have, he has some feelings of guilt. He  feels more restless or agitated than usual, and sleeps somewhat less than usual. On an apathy questionnaire, he endorsed minimal apathy.     IMPRESSIONS AND RECOMMENDATIONS    Current results indicate performance that falls within normal limits across cognitive domains. Information and psychomotor processing speed were relatively slowed, and he demonstrated a relative weakness in retrieval of visual information. He denied experiencing significant depressive symptomatology.    This pattern of performance does not reflect dementia or Mild Cognitive Impairment at this time. There is a subtle suggestion of frontal and subcortical system involvement, consistent with his history of Parkinson s disease. He has a history of REM sleep behavior disorder, and he described his sleep as restless. Nonrestorative sleep may exacerbate his subtle cognitive inefficiencies. He has up to two alcoholic beverages daily. He denied gambling, but endorsed occasional compulsive eating. He does not have a significant psychiatric history. He has a good support system. He appears to be capable of comprehending medical information and making well reasoned decisions for himself. He has a good understanding of the surgical procedure and the risks involved. He appears to be a good candidate for surgery from a neurocognitive perspective.    In terms of daily functioning, Mr. Antoine s cognitive inefficiencies are not likely to interfere with his ability to actively participate in treatment or to manage his instrumental activities of daily living independently. He may find that it takes him longer to complete tasks, so he may find it helpful to provide himself with ample time when working on a task so that he does not become overwhelmed and work less efficiently. He may benefit from the use of written reminders or checklists. Results may serve as a baseline of his neurocognitive functioning. Repeated neuropsychological evaluation in one year may help  to determine whether his cognitive inefficiencies are progressive. These results have not been discussed with Mr. Antoine, although he was encouraged to contact my office for feedback if he would like.    Mackenzie Thornton, Ph.D., [facility]  Licensed [employer]ologist, [employer] 4332  Board Certified in Clinical Neuropsychology    Time spent:  Four hours professional time, including interview, record review, data integration, and report writing (CPT 40547); an additional three hours, including testing administered by a psychometrist and interpreted by a neuropsychologist (CPT 28639). ICD-10 diagnosis: G20; F06.8.

## 2018-06-01 NOTE — TELEPHONE ENCOUNTER
FUTURE VISIT INFORMATION      FUTURE VISIT INFORMATION:    Date: 06/04/2018    Time:     Location:   REFERRAL INFORMATION:    Referring provider:  Dong Encarnacion     Referring providers clinic:      Reason for visit/diagnosis          RECORDS STATUS      Internal Records: [facility], Care Everywhere and PACS.

## 2018-06-04 ENCOUNTER — OFFICE VISIT (OUTPATIENT)
Dept: NEUROLOGY | Facility: CLINIC | Age: 73
End: 2018-06-04
Payer: COMMERCIAL

## 2018-06-04 ENCOUNTER — PRE VISIT (OUTPATIENT)
Dept: NEUROLOGY | Facility: CLINIC | Age: 73
End: 2018-06-04

## 2018-06-04 VITALS
BODY MASS INDEX: 28.34 KG/M2 | HEART RATE: 55 BPM | HEIGHT: 68 IN | DIASTOLIC BLOOD PRESSURE: 83 MMHG | SYSTOLIC BLOOD PRESSURE: 133 MMHG | WEIGHT: 187 LBS

## 2018-06-04 DIAGNOSIS — G20.A1 PARKINSON'S DISEASE (H): Primary | ICD-10-CM

## 2018-06-04 RX ORDER — AMLODIPINE BESYLATE 5 MG/1
5 TABLET ORAL DAILY
Qty: 30 TABLET | COMMUNITY
Start: 2018-06-04 | End: 2022-04-13

## 2018-06-04 RX ORDER — POTASSIUM CHLORIDE 750 MG/1
30 TABLET, EXTENDED RELEASE ORAL 2 TIMES DAILY
Qty: 90 TABLET | COMMUNITY
Start: 2018-06-04

## 2018-06-04 RX ORDER — LABETALOL 100 MG/1
100 TABLET, FILM COATED ORAL 2 TIMES DAILY
COMMUNITY
Start: 2018-06-04 | End: 2022-04-13

## 2018-06-04 RX ORDER — LOSARTAN POTASSIUM 100 MG/1
100 TABLET ORAL DAILY
COMMUNITY
Start: 2018-06-04 | End: 2022-04-13

## 2018-06-04 RX ORDER — TRIAMCINOLONE ACETONIDE 5 MG/G
CREAM TOPICAL
COMMUNITY
Start: 2018-06-04 | End: 2024-06-24

## 2018-06-04 ASSESSMENT — MOVEMENT DISORDERS SOCIETY - UNIFIED PARKINSONS DISEASE RATING SCALE (MDS-UPDRS)
SPEECH: SLIGHT: MY SPEECH IS SOFT, SLURRED OR UNEVEN, BUT IT DOES NOT CAUSE OTHERS TO ASK ME TO REPEAT MYSELF.
TURNING_IN_BED: SLIGHT: I HAVE A BIT OF TROUBLE TURNING, BUT I DO NOT NEED ANY HELP.
DRESSING: SLIGHT: I AM SLOW BUT I DO NOT NEED HELP.
TOTAL_SCORE: 14
HANDWRITING: MILD: SOME WORDS ARE UNCLEAR AND DIFFICULT TO READ.
GETTING_OUT_OF_BED_CAR_DEEP_CHAIR: SLIGHT: I AM SLOW OR AWKWARD, BUT I USUALLY CAN DO IT ON MY FIRST TRY.
WALKING_AND_BALANCE: NORMAL: NOT AT ALL (NO PROBLEMS).
FREEZING: SLIGHTLY: I BRIEFLY FREEZE BUT I CAN EASILY START WALKING AGAIN. I DO NOT NEED HELP FROM SOMEONE ELSE OR A WALKING AID (CANE OR WALKER) BECAUSE OF FREEZING.
HYGIENE: NORMAL: NOT AT ALL (NO PROBLEMS).
EATING_TASKS: SLIGHT: I AM SLOW, BUT I DO NOT NEED ANY HELP HANDLING MY FOOD AND HAVE NOT HAD FOOD SPILLS WHILE EATING.
TREMOR: MODERATE: SHAKING OR TREMOR CAUSES PROBLEMS WITH MANY OF MY DAILY ACTIVITES.
CHEWING_AND_SWALLOWING: NORMAL: NO PROBLEMS.
HOBBIES_AND_OTHER_ACTIVITIES: SLIGHT: I AM A BIT SLOW BUT DO THESE ACTIVITIES EASILY.
SALIVA_AND_DROOLING: MILD: I HAVE SOME DROOLING DURING SLEEP, BUT NONE WHEN I AM AWAKE.

## 2018-06-04 ASSESSMENT — UNIFIED PARKINSONS DISEASE RATING SCALE (UPDRS)
POSTURAL_STABILITY: NORMAL:  RECOVERS WITH ONE OR TWO STEPS.
AMPLITUDE_RUE: SLIGHT: < 1 CM IN MAXIMAL AMPLITUDE.
SPEECH: SLIGHT: LOSS OF MODULATION, DICTION OR VOLUME, BUT STILL ALL WORDS EASY TO UNDERSTAND.
AMPLITUDE_RUE: MILD > 1 CM BUT < 3 CM IN MAXIMAL AMPLITUDE.
RIGIDITY_RLE: SLIGHT: RIGIDITY ONLY DETECTED WITH ACTIVATION MANEUVER.
POSTURE: 0 NORMAL, NO PROBLEMS
AMPLITUDE_LIP_JAW: NORMAL: NO TREMOR.
AMPLITUDE_RLE: NORMAL: NO TREMOR.
LEG_AGILITY_LEFT: NORMAL
CONSTANCY_TREMOR_ATREST: SLIGHT: TREMOR AT REST IS PRESENT  25% OF THE ENTIRE EXAMINATION PERIOD.
RIGIDITY_LLE: NORMAL
POSTURE: 0 NORMAL, NO PROBLEMS
FACIAL_EXPRESSION: SLIGHT: MINIMAL MASKED FACIES MANIFESTED ONLY BY DECREASED FREQUENCY OF BLINKING.
LEG_AGILITY_RIGHT: SLIGHT: ANY OF THE FOLLOWING: A) THE REGULAR RHYTHM IS BROKEN WITH ONE WITH ONE OR TWO INTERRUPTIONS OR HESITATIONS OF THE MOVEMENT B) SLIGHT SLOWING C) THE AMPLITUDE DECREMENTS NEAR THE END OF THE 10 MOVEMENTS.
RIGIDITY_LLE: NORMAL
PRONATION_SUPINATION_RIGHT: SLIGHT: ANY OF THE FOLLOWING: A) THE REGULAR RHYTHM IS BROKEN WITH ONE WITH ONE OR TWO INTERRUPTIONS OR HESITATIONS OF THE MOVEMENT B) SLIGHT SLOWING C) THE AMPLITUDE DECREMENTS NEAR THE END OF THE 10 MOVEMENTS.
CONSTANCY_TREMOR_ATREST: MODERATE: TREMOR AT REST IS PRESENT 51-75% OF THE ENTIRE EXAMINATION PERIOD.
FINGER_TAPPING_LEFT: SLIGHT: ANY OF THE FOLLOWING: A) THE REGULAR RHYTHM IS BROKEN WITH ONE WITH ONE OR TWO INTERRUPTIONS OR HESITATIONS OF THE MOVEMENT B) SLIGHT SLOWING C) THE AMPLITUDE DECREMENTS NEAR THE END OF THE 10 MOVEMENTS.
AXIAL_SCORE: 5
SPONTANEITY_OF_MOVEMENT: 1: SLIGHT: SLIGHT GLOBAL SLOWNESS AND POVERTY OF SPONTANEOUS MOVEMENTS.
RIGIDITY_RUE: MILD: RIGIDITY DETECTED WITHOUT THE ACTIVATION MANEUVER.  FULL RANGE OF MOTION IS EASILY ACHIEVED.
LEG_AGILITY_RIGHT: SLIGHT: ANY OF THE FOLLOWING: A) THE REGULAR RHYTHM IS BROKEN WITH ONE WITH ONE OR TWO INTERRUPTIONS OR HESITATIONS OF THE MOVEMENT B) SLIGHT SLOWING C) THE AMPLITUDE DECREMENTS NEAR THE END OF THE 10 MOVEMENTS.
FINGER_TAPPING_RIGHT: SLIGHT: ANY OF THE FOLLOWING: A) THE REGULAR RHYTHM IS BROKEN WITH ONE WITH ONE OR TWO INTERRUPTIONS OR HESITATIONS OF THE MOVEMENT B) SLIGHT SLOWING C) THE AMPLITUDE DECREMENTS NEAR THE END OF THE 10 MOVEMENTS.
RIGIDITY_RLE: SLIGHT: RIGIDITY ONLY DETECTED WITH ACTIVATION MANEUVER.
TOTAL_SCORE: 22
AMPLITUDE_LIP_JAW: NORMAL: NO TREMOR.
PARKINSONS_MEDS: ON
GAIT: NORMAL
PRONATION_SUPINATION_LEFT: SLIGHT: ANY OF THE FOLLOWING: A) THE REGULAR RHYTHM IS BROKEN WITH ONE WITH ONE OR TWO INTERRUPTIONS OR HESITATIONS OF THE MOVEMENT B) SLIGHT SLOWING C) THE AMPLITUDE DECREMENTS NEAR THE END OF THE 10 MOVEMENTS.
GAIT: NORMAL
PARKINSONS_MEDS: OFF
RIGIDITY_LUE: SLIGHT: RIGIDITY ONLY DETECTED WITH ACTIVATION MANEUVER.
AMPLITUDE_LUE: SLIGHT: < 1 CM IN MAXIMAL AMPLITUDE.
FINGER_TAPPING_LEFT: SLIGHT: ANY OF THE FOLLOWING: A) THE REGULAR RHYTHM IS BROKEN WITH ONE WITH ONE OR TWO INTERRUPTIONS OR HESITATIONS OF THE MOVEMENT B) SLIGHT SLOWING C) THE AMPLITUDE DECREMENTS NEAR THE END OF THE 10 MOVEMENTS.
TOETAPPING_RIGHT: SLIGHT: ANY OF THE FOLLOWING: A) THE REGULAR RHYTHM IS BROKEN WITH ONE WITH ONE OR TWO INTERRUPTIONS OR HESITATIONS OF THE MOVEMENT B) SLIGHT SLOWING C) THE AMPLITUDE DECREMENTS NEAR THE END OF THE 10 MOVEMENTS.
TOTAL_SCORE: 36
AXIAL_SCORE: 4
AMPLITUDE_LUE: SLIGHT: < 1 CM IN MAXIMAL AMPLITUDE.
RIGIDITY_NECK: SLIGHT: RIGIDITY ONLY DETECTED WITH ACTIVATION MANEUVER.
TOETAPPING_LEFT: SLIGHT: ANY OF THE FOLLOWING: A) THE REGULAR RHYTHM IS BROKEN WITH ONE WITH ONE OR TWO INTERRUPTIONS OR HESITATIONS OF THE MOVEMENT B) SLIGHT SLOWING C) THE AMPLITUDE DECREMENTS NEAR THE END OF THE 10 MOVEMENTS.
TOETAPPING_LEFT: NORMAL
ARISING_CHAIR: NORMAL: ABLE TO ARISE QUICKLY WITHOUT HESITATION.
TOTAL_SCORE: 18
TOTAL_SCORE_LEFT: 6
HANDMOVEMENTS_RIGHT: MILD: ANY OF THE FOLLOWING: A) 3 TO 5 INTERRUPTIONS DURING TAPPING B) MILD SLOWING C) THE AMPLITUDE DECREMENTS MIDWAY IN THE 10-MOVEMENT SEQUENCE
HANDMOVEMENTS_RIGHT: SLIGHT: ANY OF THE FOLLOWING: A) THE REGULAR RHYTHM IS BROKEN WITH ONE WITH ONE OR TWO INTERRUPTIONS OR HESITATIONS OF THE MOVEMENT B) SLIGHT SLOWING C) THE AMPLITUDE DECREMENTS NEAR THE END OF THE 10 MOVEMENTS.
PRONATION_SUPINATION_RIGHT: MILD: ANY OF THE FOLLOWING: A) 3 TO 5 INTERRUPTIONS DURING TAPPING B) MILD SLOWING C) THE AMPLITUDE DECREMENTS MIDWAY IN THE 10-MOVEMENT SEQUENCE
FREEZING_GAIT: NORMAL
SPONTANEITY_OF_MOVEMENT: 1: SLIGHT: SLIGHT GLOBAL SLOWNESS AND POVERTY OF SPONTANEOUS MOVEMENTS.
RIGIDITY_LUE: SLIGHT: RIGIDITY ONLY DETECTED WITH ACTIVATION MANEUVER.
HANDMOVEMENTS_LEFT: NORMAL
TOTAL_SCORE_LEFT: 10
RIGIDITY_RUE: MILD: RIGIDITY DETECTED WITHOUT THE ACTIVATION MANEUVER.  FULL RANGE OF MOTION IS EASILY ACHIEVED.
POSTURAL_STABILITY: NORMAL:  RECOVERS WITH ONE OR TWO STEPS.
AMPLITUDE_LLE: NORMAL: NO TREMOR.
TOTAL_SCORE: 11
LEG_AGILITY_LEFT: SLIGHT: ANY OF THE FOLLOWING: A) THE REGULAR RHYTHM IS BROKEN WITH ONE WITH ONE OR TWO INTERRUPTIONS OR HESITATIONS OF THE MOVEMENT B) SLIGHT SLOWING C) THE AMPLITUDE DECREMENTS NEAR THE END OF THE 10 MOVEMENTS.
ARISING_CHAIR: NORMAL: ABLE TO ARISE QUICKLY WITHOUT HESITATION.
AMPLITUDE_LLE: NORMAL: NO TREMOR.
HANDMOVEMENTS_LEFT: SLIGHT: ANY OF THE FOLLOWING: A) THE REGULAR RHYTHM IS BROKEN WITH ONE WITH ONE OR TWO INTERRUPTIONS OR HESITATIONS OF THE MOVEMENT B) SLIGHT SLOWING C) THE AMPLITUDE DECREMENTS NEAR THE END OF THE 10 MOVEMENTS.
PRONATION_SUPINATION_LEFT: MILD: ANY OF THE FOLLOWING: A) 3 TO 5 INTERRUPTIONS DURING TAPPING B) MILD SLOWING C) THE AMPLITUDE DECREMENTS MIDWAY IN THE 10-MOVEMENT SEQUENCE
FACIAL_EXPRESSION: SLIGHT: MINIMAL MASKED FACIES MANIFESTED ONLY BY DECREASED FREQUENCY OF BLINKING.
SPEECH: SLIGHT: LOSS OF MODULATION, DICTION OR VOLUME, BUT STILL ALL WORDS EASY TO UNDERSTAND.
FREEZING_GAIT: NORMAL
TOETAPPING_RIGHT: MILD: ANY OF THE FOLLOWING: A) 3 TO 5 INTERRUPTIONS DURING TAPPING B) MILD SLOWING C) THE AMPLITUDE DECREMENTS MIDWAY IN THE 10-MOVEMENT SEQUENCE
AMPLITUDE_RLE: NORMAL: NO TREMOR.
RIGIDITY_NECK: MILD: RIGIDITY DETECTED WITHOUT THE ACTIVATION MANEUVER.  FULL RANGE OF MOTION IS EASILY ACHIEVED.
FINGER_TAPPING_RIGHT: MILD: ANY OF THE FOLLOWING: A) 3 TO 5 INTERRUPTIONS DURING TAPPING B) MILD SLOWING C) THE AMPLITUDE DECREMENTS MIDWAY IN THE 10-MOVEMENT SEQUENCE

## 2018-06-04 ASSESSMENT — PAIN SCALES - GENERAL: PAINLEVEL: NO PAIN (0)

## 2018-06-04 NOTE — MR AVS SNAPSHOT
"              After Visit Summary   6/4/2018    Kingston Antoine    MRN: [id number]           Patient Information     Date Of Birth          1945        Visit Information        Provider Department      6/4/2018 7:20 AM Sandra Weston APRN CNP [facility] Neurology        Care Instructions      __  You have completed the ON/OFF Motor Assessment.    __  Continue with your DBS workup appointments.  __  Once you're done with your workup, we'll discuss you at the DBS Consensus meeting & will let you know the decision.  __  You may contact us with any question.             Follow-ups after your visit        Who to contact     Please call your clinic at [phone] to:    Ask questions about your health    Make or cancel appointments    Discuss your medicines    Learn about your test results    Speak to your doctor            Additional Information About Your Visit        MyChart Information     Reamaze gives you secure access to your electronic health record. If you see a primary care provider, you can also send messages to your care team and make appointments. If you have questions, please call your primary care clinic.  If you do not have a primary care provider, please call [phone] and they will assist you.      Reamaze is an electronic gateway that provides easy, online access to your medical records. With Reamaze, you can request a clinic appointment, read your test results, renew a prescription or communicate with your care team.     To access your existing account, please contact your [facility] or call [phone] for assistance.        Care EveryWhere ID     This is your Care EveryWhere ID. This could be used by other organizations to access your [place] medical records  [id number]        Your Vitals Were     Pulse Height BMI (Body Mass Index)             55 1.727 m (5' 8\") 28.43 kg/m2          Blood Pressure from Last 3 Encounters:   06/04/18 133/83   05/21/18 " 121/62   05/02/18 147/73    Weight from Last 3 Encounters:   06/04/18 84.8 kg (187 lb)   05/21/18 85.1 kg (187 lb 11.2 oz)   05/02/18 85.2 kg (187 lb 12.8 oz)              Today, you had the following     No orders found for display         Today's Medication Changes          These changes are accurate as of 6/4/18  9:25 AM.  If you have any questions, ask your nurse or doctor.               These medicines have changed or have updated prescriptions.        Dose/Directions    amLODIPine 5 MG tablet   Commonly known as:  NORVASC   This may have changed:    - medication strength  - how much to take   Changed by:  Sandra Weston APRN CNP        Dose:  5 mg   Take 1 tablet (5 mg) by mouth daily   Quantity:  30 tablet   Refills:  0       labetalol 100 MG tablet   Commonly known as:  NORMODYNE   This may have changed:    - how much to take  - how to take this  - when to take this   Changed by:  Sandra Weston APRN CNP        Dose:  100 mg   Take 1 tablet (100 mg) by mouth 2 times daily   Refills:  0       losartan 100 MG tablet   Commonly known as:  COZAAR   This may have changed:  medication strength   Changed by:  Sandra Weston APRN CNP        Dose:  100 mg   Take 1 tablet (100 mg) by mouth daily   Refills:  0       omeprazole 20 MG CR capsule   Commonly known as:  priLOSEC   This may have changed:    - medication strength  - how much to take  - when to take this   Changed by:  Sandra Weston APRN CNP        Dose:  40 mg   Take 2 capsules (40 mg) by mouth daily   Refills:  0       potassium chloride 10 MEQ tablet   Commonly known as:  K-TAB,KLOR-CON   This may have changed:  how much to take   Changed by:  Sandra Weston APRN CNP        Dose:  30 mEq   Take 3 tablets (30 mEq) by mouth 2 times daily   Quantity:  90 tablet   Refills:  0                Primary Care Provider Fax #    Physician No Ref-Primary [phone]       No address on file        Equal Access to Services     LENNOX WILBURN  AH: Guanakoii bebo angjeremyrachel Florecita, waaxda luqadaha, qaybta kaaltaryn kelsey, amy mariliain hayaadustin gaviriacecy sparrow jasmyne smith. So [facility] [phone].    ATENCIÓN: Si marc tran, tiene a manzanares disposición servicios gratuitos de asistencia lingüística. Llame al [phone].    We comply with applicable federal civil rights laws and [place] laws. We do not discriminate on the basis of race, color, national origin, age, disability, sex, sexual orientation, or gender identity.            Thank you!     Thank you for choosing [facility] NEUROLOGY  for your care. Our goal is always to provide you with excellent care. Hearing back from our patients is one way we can continue to improve our services. Please take a few minutes to complete the written survey that you may receive in the mail after your visit with us. Thank you!             Your Updated Medication List - Protect others around you: Learn how to safely use, store and throw away your medicines at www.disposemymeds.org.          This list is accurate as of 6/4/18  9:25 AM.  Always use your most recent med list.                   Brand Name Dispense Instructions for use Diagnosis    amLODIPine 5 MG tablet    NORVASC    30 tablet    Take 1 tablet (5 mg) by mouth daily        * carbidopa-levodopa  MG per CR tablet    SINEMET CR     Take 1 tablet by mouth At Bedtime        * carbidopa-levodopa  MG per tablet    SINEMET    150 tablet    Take one tablet every 3 hours while awake.    Parkinson's disease (H)       FOLBIC 2.5-25-2 MG Tabs per tablet   Generic drug:  fa-pyridoxine-cyancobalamin      Take 2 tablets by mouth daily        labetalol 100 MG tablet    NORMODYNE     Take 1 tablet (100 mg) by mouth 2 times daily        losartan 100 MG tablet    COZAAR     Take 1 tablet (100 mg) by mouth daily        omeprazole 20 MG CR capsule    priLOSEC     Take 2 capsules (40 mg) by mouth daily        potassium chloride 10 MEQ tablet    K-TAB,KLOR-CON    90 tablet    Take 3  tablets (30 mEq) by mouth 2 times daily        triamcinolone 0.5 % cream    KENALOG     Topical 2 x daily a thin layer to affected areas        triamterene-hydrochlorothiazide 37.5-25 MG per tablet    MAXZIDE-25     Take 1 tablet by mouth daily        * Notice:  This list has 2 medication(s) that are the same as other medications prescribed for you. Read the directions carefully, and ask your doctor or other care provider to review them with you.

## 2018-06-04 NOTE — PROGRESS NOTES
PATIENT: Kingston Antoine    : 1945    OSCAR: 2018    REASON FOR VISIT:  Pre-DBS ON/OFF motor symptom evaluation.      HPI: Mr. Kingston Antoine is a 72 year old right-handed male who came to the [facility] neurology clinic alone for ON/OFF motor evaluation as part of Deep Brain Stimulation surgery work-up for management of Parkinson's disease.     He was diagnosed with Idiopathic Parkinson's disease about six years ago. His symptoms started about seven years ago with tremor. Accompanying symptoms included micrographia, & constipation. He also reported dream enactment behavior preceding symptom onset by approximately 13 years. He denied problems with balance or falls. Although walking has slowed down. Has observed two ever episodes of gait freezing in the last couple months in a narrow location. Denied dyskinesias.     He is a retired , but wishes he had been a . Both of his sons are physical . In his [facility], he travels frequently. 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 He walks 2-3 miles at least three times per week.     His goals for DBS:  1. Manage tremor  2. Has read that DBS can help with gait when used at lower frequencies. His gait is quite good and he has very good function currently. He hopes that if he has DBS in place, perhaps it would be helpful for his gait in the future should he decline.   3. He is afraid that if he waits for DBS until he absolutely needs it - he will be too old    He would like his left brain completed first for the right body because tremor is worse on the right and he is right hand dominant.     He has brought his recent labs, including Hemoglobin A1C, which was sent to be scanned into Epic.     Current antiparkinsonian medication:     PD Medications                   6am 9 12 3 6 9 1130   Carbidopa/levodopa 1 1 1 1 1 1    Carbidopa/levodopa 50/200 mg CR       1  "      Last dose of antiparkinsonian medication was taken: 430 pm yesterday.    In clinic, OFF motor exam was completed and patient took 1.5 tabs carbidopa/levodopa 25/100 mg at 8:30 am.  After 60 minutes, ON motor exam was completed.    Physical Exam:    Vital Signs:  Blood pressure 133/83, pulse 55, height 1.727 m (5' 8\"), weight 84.8 kg (187 lb).  Body mass index is 28.43 kg/(m^2).    MDS Part II  -- Total Score: 14     -- Over the last week -- 0: Normal -- 1: Slight -- 2: Mild -- 3: Moderate -- 4: Severe     2.1 Speech: 1   2.2 Saliva and droolin   2.3 Chewing and swallowin   2.4 Eating tasks: 1   2.5 Dressin   2.6 Hygiene:  0   2.7 Handwritin   2.8 Doing hobbies and other activities:  1   2.9 Turning in bed:  1   2.10 Tremor:  3   2.11 Getting out of bed/car/deep chair:   1   2.12 Walking and balance: 0   2.13 Freezin     Total:    14        MOTOR TEST: UPDRS Flowsheet was completed in Epic. Exam was videotaped.   Total OFF score = 36  Total ON score = 22    Percentage: 36 - 22 = 14 --> 14 ÷ 36 = 0.3888 = 39% motor improvement.    UPDRS Values 2018   Time: 8:13 AM 9:13 AM   Medication Off On   R Brain DBS: None None   L Brain DBS: None None   Speech 1 1   Facial Expression 1 1   Rigidity Neck 2 1   Rigidity RUE 2 2   Rigidity LUE 1 1   Rigidity RLE 1 1   Rigidity LLE 0 0   Finger Taps R 2 1   Finger Taps L 1 1   Hand Mvt R 2 1   Hand Mvt L 1 0   Pron-/Supinate R 2 1   Pron-/Supinate L 2 1   Toe Tap R 2 1   Toe Tap L 1 0   Leg Agility R 1 1   Leg Agility L 1 0   Arise From Chair 0 0   Gait 0 0   Gait Freezing 0 0   Postural Stability 0 0   Posture 0 0   Global Spont Mvt 1 1   Postural Tremor RUE 2 1   Postural Tremor LUE 1 1   Kinetic Tremor RUE 2 1   Kinetic Tremor LUE 1 1   Rest Tremor RUE 2 1   Rest Tremor LUE 1 1   Rest Tremor RLE 0 0   Rest Tremor LLE 0 0   Rest Tremor Lip/Jaw 0 0   Rest Tremor Constancy 3 1   Total Right 18 11   Total Left 10 6   Axial Total 5 4   Total " 36 22         ASSESSMENT/PLAN:    Parkinson's Disease: Mr. Antoine is a 72 year old right-handed male with a seven year history of Idiopathic Parkinson's disease who came to the clinic for ON/OFF motor evaluation as part of his Deep Brain Stimulation surgery work-up.    __  Pt had some knowledge about DBS through his reading and meeting with Dr. Gama.  I briefly went over DBS risks, & benefits; the possibility of 1 - 3 % serious side effects including infection, bleeding, stroke, cognitive & speech impairment, seizures & death; the possibility of lead misplacement; appropriate DBS candidates; and DBS programming & the need to return to clinic for reprogramming.  All questions were answered.    __  He was informed that we'll contact him after the DBS team meets & discusses his work-up. He understands the plan.    The total time spent with the patient in ON/OFF motor evaluation & discussing about DBS surgery was 120 minutes and greater than 50% of the time was spent in counseling & coordination of care.    Patient was seen along with Latesha Pritchard MD Movement Disorders Fellow, who completed the videotaping & assessment.     ANEUDY Sylvester, CNP   [facility]

## 2018-06-04 NOTE — PATIENT INSTRUCTIONS
__  You have completed the ON/OFF Motor Assessment.    __  Continue with your DBS workup appointments.  __  Once you're done with your workup, we'll discuss you at the DBS Consensus meeting & will let you know the decision.  __  You may contact us with any question.

## 2018-06-04 NOTE — Clinical Note
2018       RE: Kingston Antoine  [address] [address] [address]     Dear Colleague,    Thank you for referring your patient, Kingston Antoine, to the [facility] NEUROLOGY at [facility]. Please see a copy of my visit note below.    PATIENT: Kingston Antoine    : 1945    OSCAR: 2018    REASON FOR VISIT:  Pre-DBS ON/OFF motor symptom evaluation.      HPI: Mr. Kingston Antoine is a 72 year old right handed male who came to the [facility] neurology clinic alone for ON/OFF motor evaluation as part of Deep Brain Stimulation surgery work-up for management of Parkinson's disease.     He was diagnosed with Idiopathic Parkinson's disease about six years ago. His symptoms started about seven years ago with tremor. Accompanying symptoms included micrographia, constipation. He also reported dream enactment behavior preceding symptom onset by approximately 13 years. He denied problems with balance or falls. Although walking has slowed down. Has observed two ever episodes of gait freezing in the last couple months in a narrow location. Denied dyskinesias.     He is a retired , but wishes he had been a . Both of his sons are physical . In his [facility], he travels frequently. Over the winter, he spent two months in [place] and participated in archeological dinosaur digs with his partner. He walks 2-3 miles at least three times per week.     His goals for DBS:  1. Manage tremor  2. Has read that DBS can help with gait when used at lower frequencies. His gait is quite good and he has very good function currently. He hopes that if he has DBS in place, perhaps it would be helpful for his gait in the future should he decline.   3. He is afraid that if he waits for DBS until he absolutely needs it - he will be too old    He would like his left brain completed first for the right body because tremor is worse on the right and he is right hand dominant.  "    Current antiparkinsonian medication:   Outpatient Prescriptions Marked as Taking for the 18 encounter (Office Visit) with Sandra Weston APRN CNP   Medication Sig     AMLODIPINE BESYLATE PO Take 10 mg by mouth daily     carbidopa-levodopa (SINEMET CR)  MG per tablet Take 1 tablet by mouth At Bedtime     carbidopa-levodopa (SINEMET)  MG per tablet Take one tablet every 3 hours while awake.     labetalol (NORMODYNE) 100 MG tablet      LOSARTAN POTASSIUM PO Take 100 mg by mouth daily     OMEPRAZOLE PO Take 20 mg by mouth     potassium chloride (K-DUR) 10 MEQ tablet Take 10 mEq by mouth 2 times daily     triamterene-hydrochlorothiazide (MAXZIDE-25) 37.5-25 MG per tablet Take 1 tablet by mouth daily     UNABLE TO FIND      UNABLE TO FIND 1 tablet daily MEDICATION NAME: bi     PD Medications                   6am 9 12 3 6 9 1130   Carbidopa/levodopa 1 1 1 1 1 1    Carbidopa/levodopa 50/200 mg CR       1       Last dose of antiparkinsonian medication was taken: 430 pm yesterday.    In clinic, OFF motor exam was completed and patient took 1.5 tabs carbidopa/levodopa 25/100 mg at 8:30 am.  After *** minutes, ON motor exam was completed.    Physical Exam:    Vital Signs:  Blood pressure 133/83, pulse 55, height 1.727 m (5' 8\"), weight 84.8 kg (187 lb)., Body mass index is 28.43 kg/(m^2).    MDS Part II  -- Total Score: 14     -- Over the last week -- 0: Normal -- 1: Slight -- 2: Mild -- 3: Moderate -- 4: Severe     2.1 Speech: 1   2.2 Saliva and droolin   2.3 Chewing and swallowin   2.4 Eating tasks: 1   2.5 Dressin   2.6 Hygiene:  0   2.7 Handwritin   2.8 Doing hobbies and other activities:  1   2.9 Turning in bed:  1   2.10 Tremor:  3   2.11 Getting out of bed/car/deep chair:   1   2.12 Walking and balance: 0   2.13 Freezin     Total:    14        MOTOR TEST: UPDRS Flowsheet was completed in Epic. Exam was videotaped.   Total OFF score = 36  Total ON score = " 22    Percentage: 36 - 22 = 14 --> 14 ÷  36 = 0.3888 = 39% motor improvement.    UPDRS Values 6/4/2018 6/4/2018   Time: 8:13 AM 9:13 AM   Medication Off On   R Brain DBS: None None   L Brain DBS: None None   Speech 1 1   Facial Expression 1 1   Rigidity Neck 2 1   Rigidity RUE 2 2   Rigidity LUE 1 1   Rigidity RLE 1 1   Rigidity LLE 0 0   Finger Taps R 2 1   Finger Taps L 1 1   Hand Mvt R 2 1   Hand Mvt L 1 0   Pron-/Supinate R 2 1   Pron-/Supinate L 2 1   Toe Tap R 2 1   Toe Tap L 1 0   Leg Agility R 1 1   Leg Agility L 1 0   Arise From Chair 0 0   Gait 0 0   Gait Freezing 0 0   Postural Stability 0 0   Posture 0 0   Global Spont Mvt 1 1   Postural Tremor RUE 2 1   Postural Tremor LUE 1 1   Kinetic Tremor RUE 2 1   Kinetic Tremor LUE 1 1   Rest Tremor RUE 2 1   Rest Tremor LUE 1 1   Rest Tremor RLE 0 0   Rest Tremor LLE 0 0   Rest Tremor Lip/Jaw 0 0   Rest Tremor Constancy 3 1   Total Right 18 11   Total Left 10 6   Axial Total 5 4   Total 36 22         ASSESSMENT/PLAN:    Parkinson's Disease: Mr. Antoine is a 72 year old right handed male with a seven year history of Idiopathic Parkinson's disease who came to the clinic for ON/OFF motor evaluation as part of his Deep Brain Stimulation surgery work-up.    __  Pt had some knowledge about DBS through his reading and meeting with Dr. Gama.   I briefly went over DBS risks, & benefits; the possibility of 1 - 3 % serious side effects including infection, bleeding, stroke, cognitive & speech impairment, seizures & death; the possibility of lead misplacement; appropriate DBS candidates; and DBS programming & the need to return to clinic for reprogramming.  All questions were answered.    __  He was informed that we'll contact him after the DBS team meets & discusses his work-up. He understands the plan.    The total time spent with the patient in ON/OFF motor evaluation & discussing about DBS surgery was 120 minutes and greater than 50% of the time was spent in counseling &  coordination of care.    ANEUDY Sylvester, CNP   [facility]              Again, thank you for allowing me to participate in the care of your patient.      Sincerely,    ANEUDY Lynch CNP

## 2018-06-05 PROBLEM — R48.8 ANOMIA: Status: ACTIVE | Noted: 2018-01-04

## 2018-06-05 PROBLEM — R49.0 DYSPHONIA: Status: ACTIVE | Noted: 2018-04-05

## 2018-06-05 PROBLEM — R26.9 GAIT DISORDER: Status: ACTIVE | Noted: 2018-04-05

## 2018-06-06 ENCOUNTER — TELEPHONE (OUTPATIENT)
Dept: NEUROLOGY | Facility: CLINIC | Age: 73
End: 2018-06-06

## 2018-06-06 NOTE — TELEPHONE ENCOUNTER
"                                                      Deep Brain Stimulation Surgery Surgical Candidacy Form    Referring Provider: Dr. Dong Encarnacion [facility]                                             Patient Information:  Name: Kingston Antoine  YOB: 1945  Age: 72 year old  Height: 5'8\"  Weight: 187 lbs  BMI: 28.49  Blood Pressure: 133/83  Diagnosis: Parkinson's disease  Age of Onset of Symptoms: 66. Year of Onset of Symptoms: 2012.  Age of Diagnosis: 66. Year of Diagnosis: 2012.  Handedness: Right.  Side of Onset: Right upper extremity.     Disease Features: Tremor in right and left hand and right foot.   Surgery Goals: Decrease tremor. and Other: DBS to help with gait at lower frequencies - not an issue yet for him.   He would also like it to help his posture which is stooped.       Medications: As of  6/6/18  Current Outpatient Prescriptions   Medication Sig Dispense Refill     amLODIPine (NORVASC) 5 MG tablet Take 1 tablet (5 mg) by mouth daily 30 tablet      carbidopa-levodopa (SINEMET CR)  MG per tablet Take 1 tablet by mouth At Bedtime       carbidopa-levodopa (SINEMET)  MG per tablet Take one tablet every 3 hours while awake. 150 tablet 3     fa-pyridoxine-cyancobalamin (FOLBIC) 2.5-25-2 MG TABS per tablet Take 2 tablets by mouth daily       labetalol (NORMODYNE) 100 MG tablet Take 1 tablet (100 mg) by mouth 2 times daily       losartan (COZAAR) 100 MG tablet Take 1 tablet (100 mg) by mouth daily       omeprazole (PRILOSEC) 20 MG CR capsule Take 2 capsules (40 mg) by mouth daily       potassium chloride (K-TAB,KLOR-CON) 10 MEQ tablet Take 3 tablets (30 mEq) by mouth 2 times daily 90 tablet      triamcinolone (KENALOG) 0.5 % cream Topical 2 x daily a thin layer to affected areas       triamterene-hydrochlorothiazide (MAXZIDE-25) 37.5-25 MG per tablet Take 1 tablet by mouth daily       PD Medications                   6am 9 12 3 6 9 1130 "   Carbidopa/levodopa 1 1 1 1 1 1     Carbidopa/levodopa 50/200 mg CR             1       Motor Assessment: 6/4/18  ON: 22  OFF: 36  % Change: 39%     Neuropsychological Evaluation: 5/15/18    Cognitive Issues: Results indicate performance that falls within normal limits across cognitive domains. Information and psychomotor processing speed are relatively slowed, and he demonstrated a relative weakness in visual information.     Psychiatric Issues: He denied any history of psychiatric illness.   He denied significant depressive symptomatology or apathy. He drinks up to two alcoholic beverages a day. He denied gambling, but endorsed occasional compulsive eating. He has a good support system.   Depression: No depression.    Cognitive Function: No signs or symptoms of cognitive dysfunction.    Psychosis: No hallucinations.     MRI Date: 5/21/18    Impression:   1. Mild diffuse generalized cerebral and cerebellar atrophy.  2. Nonspecific scattered T2 hyperintensities. These may represent  simply age-related changes versus sequela of mild chronic small vessel  ischemic disease.  3. 2 punctate tiny foci of microhemorrhages in the subcortical white  matter.   PMH: -  Past Medical History:   Diagnosis Date     HTN (hypertension)      Migraine      Parkinson disease (H)      Diabetes Mellitus Type 2 managed with diet - last A1C = 6.3  He is not aware of having hyperlipidemia (6/7/18 SEV)    Past Surgical History:   Procedure Laterality Date     ------------OTHER-------------      anal fistula repair     ARTHROSCOPY KNEE       cholecystectomy       HERNIA REPAIR       TONSILLECTOMY & ADENOIDECTOMY       TUNA       Soc Hx:  reports that he has never smoked. He has never used smokeless tobacco.    Family Hx of Movement Disorders: family history is not on file.    Consult Dr. Meng Liriano     Patient discussed at conference on: 6/7/18     DBS Meeting Notes/Further Work-up Needed: -  Melania to hold off calling until Dr. Gama  verifies he can target the STN.  1. DBS not expected to improve gait (doubtful) it is doubtful that it would improve gait  posture (literature does not support)  2. LSTN  3. Device is pending discussion with research ([facility] if not research)  4. Wait and see     6/22/18: [employer] message sent to patient because he is out of town until 6/27.    7/10/18 Patient is not an appropriate candidate for DEEP BRAIN STIMULATION due to the inability to safely target the STN.     There is no safe trajectory to the left STN due to:     1) large ventricle requiring lateral approach.   2) cortical atrophy resulting in large sulci.   3) two vascular structures in the way above the target along the trajectory.

## 2018-06-07 ENCOUNTER — TELEPHONE (OUTPATIENT)
Dept: NEUROLOGY | Facility: CLINIC | Age: 73
End: 2018-06-07

## 2018-06-07 NOTE — TELEPHONE ENCOUNTER
Left voice mail on patient's phone to check his MyChart as I am sending him a message and would appreciate a response. Gave the [phone] number to call with a good time to reach him if he cannot access his MyChart.    I want to review his problem list - does he have diabetes and hyperlipidemia? Anything else?

## 2018-06-07 NOTE — TELEPHONE ENCOUNTER
[facility] Call [facility]    Phone Message    May a detailed message be left on voicemail: yes    Reason for Call: Other: Pt is requesting to speak with Melania Contreras. He stated he gave the wrong A1c value to Melania. He said his A1c was 6.3, NOT 7.3. He still would like to speak to Melania.     Action Taken: Message routed to:  [facility] Neurology

## 2018-06-07 NOTE — TELEPHONE ENCOUNTER
[facility] Call [facility]    Phone Message    May a detailed message be left on voicemail: yes    Reason for Call: Other: PT returning call to Melania.  Per her request, he stated he can be reached for the next 90 minutes     Action Taken: Message routed to:  [facility] Neurology

## 2018-06-08 ENCOUNTER — TELEPHONE (OUTPATIENT)
Dept: NEUROSURGERY | Facility: CLINIC | Age: 73
End: 2018-06-08

## 2018-06-08 NOTE — TELEPHONE ENCOUNTER
Returned pt's call. He wanted to know if RN Melania Contreras needed any additional lab reports or documentation from his PCP. In looking through the [email or web address] messages, it appears we have what we need; his [email or web address] message from 6/7/18 indicates Dr. Dong Encarnacion's contact information. I let pt know that if we need anything else, we can contact Dr. Encarnacion's clinic. No further questions.

## 2018-06-12 DIAGNOSIS — G20.A1 PARKINSON'S DISEASE (H): ICD-10-CM

## 2018-06-12 RX ORDER — CARBIDOPA AND LEVODOPA 25; 100 MG/1; MG/1
TABLET ORAL
Qty: 720 TABLET | Refills: 3 | Status: SHIPPED | OUTPATIENT
Start: 2018-06-12 | End: 2022-04-13

## 2018-06-12 NOTE — TELEPHONE ENCOUNTER
Received fax requesting 90 supply of Carbidopa-Levodopa 25-100mg tabs instead of 30 day supply.  Sent in order to [facility].

## 2018-06-15 ENCOUNTER — TELEPHONE (OUTPATIENT)
Dept: NEUROLOGY | Facility: CLINIC | Age: 73
End: 2018-06-15

## 2018-06-15 NOTE — TELEPHONE ENCOUNTER
From DEEP BRAIN STIMULATION Consensus meeting 6/7/18:    Melania to hold off calling until Dr. Gama verifies he can target the STN.  1. DBS not expected to improve gait (doubtful) it is doubtful that it would improve gait  posture (literature does not support)  2. LSTN  3. Device is pending discussion with research ([facility] if not research)  4. Wait and see    6/22/18: [employer] message sent to patient because he is out of town until 6/27.    Dear Kingston,     I'm sorry I have not contacted you until now. I am waiting to find out from Dr. Gama if he will be able to target the location in your brain. There are some logistical issues with your brain MRI that need to be worked out before I can let you know if you are a candidate or not. If Dr. Gama can target the location safely, you will be a candidate. I will contact you as soon as I know the answer.     Called patient back to discuss Dr. Gama's comments to me.      Left STN targeting was unsuccessful.  There is no safe trajectory to the left STN due to:   1) large ventricle requiring lateral approach.   2) cortical atrophy resulting in large sulci.   3) two vascular structures in the way above the target along the trajectory.

## 2018-06-28 ENCOUNTER — ANESTHESIA (OUTPATIENT)
Dept: SURGERY | Facility: CLINIC | Age: 73
End: 2018-06-28
Payer: MEDICARE

## 2018-06-28 ENCOUNTER — ANESTHESIA EVENT (OUTPATIENT)
Dept: SURGERY | Facility: CLINIC | Age: 73
End: 2018-06-28
Payer: MEDICARE

## 2018-06-28 ENCOUNTER — HOSPITAL ENCOUNTER (OUTPATIENT)
Facility: CLINIC | Age: 73
Discharge: HOME | End: 2018-06-28
Attending: OPHTHALMOLOGY | Admitting: OPHTHALMOLOGY
Payer: MEDICARE

## 2018-06-28 ENCOUNTER — SURGERY (OUTPATIENT)
Age: 73
End: 2018-06-28

## 2018-06-28 VITALS
WEIGHT: 188.2 LBS | RESPIRATION RATE: 16 BRPM | HEIGHT: 68 IN | DIASTOLIC BLOOD PRESSURE: 75 MMHG | HEART RATE: 52 BPM | TEMPERATURE: 98.2 F | OXYGEN SATURATION: 95 % | BODY MASS INDEX: 28.52 KG/M2 | SYSTOLIC BLOOD PRESSURE: 129 MMHG

## 2018-06-28 DIAGNOSIS — Z98.890 POSTOPERATIVE EYE STATE: Primary | ICD-10-CM

## 2018-06-28 PROCEDURE — 88305 TISSUE EXAM BY PATHOLOGIST: CPT | Performed by: OPHTHALMOLOGY

## 2018-06-28 PROCEDURE — 25000132 ZZH RX MED GY IP 250 OP 250 PS 637: Mod: GY | Performed by: OPHTHALMOLOGY

## 2018-06-28 PROCEDURE — 37000009 ZZH ANESTHESIA TECHNICAL FEE, EACH ADDTL 15 MIN: Performed by: OPHTHALMOLOGY

## 2018-06-28 PROCEDURE — 36000058 ZZH SURGERY LEVEL 3 EA 15 ADDTL MIN: Performed by: OPHTHALMOLOGY

## 2018-06-28 PROCEDURE — 36000056 ZZH SURGERY LEVEL 3 1ST 30 MIN: Performed by: OPHTHALMOLOGY

## 2018-06-28 PROCEDURE — 25000128 H RX IP 250 OP 636: Performed by: ANESTHESIOLOGY

## 2018-06-28 PROCEDURE — 37000008 ZZH ANESTHESIA TECHNICAL FEE, 1ST 30 MIN: Performed by: OPHTHALMOLOGY

## 2018-06-28 PROCEDURE — 27210794 ZZH OR GENERAL SUPPLY STERILE: Performed by: OPHTHALMOLOGY

## 2018-06-28 PROCEDURE — 88331 PATH CONSLTJ SURG 1 BLK 1SPC: CPT | Mod: 26,76 | Performed by: OPHTHALMOLOGY

## 2018-06-28 PROCEDURE — 25000128 H RX IP 250 OP 636: Performed by: NURSE ANESTHETIST, CERTIFIED REGISTERED

## 2018-06-28 PROCEDURE — 71000027 ZZH RECOVERY PHASE 2 EACH 15 MINS: Performed by: OPHTHALMOLOGY

## 2018-06-28 PROCEDURE — 25000125 ZZHC RX 250: Performed by: NURSE ANESTHETIST, CERTIFIED REGISTERED

## 2018-06-28 PROCEDURE — 71000012 ZZH RECOVERY PHASE 1 LEVEL 1 FIRST HR: Performed by: OPHTHALMOLOGY

## 2018-06-28 PROCEDURE — 88305 TISSUE EXAM BY PATHOLOGIST: CPT | Mod: 26 | Performed by: OPHTHALMOLOGY

## 2018-06-28 PROCEDURE — A9270 NON-COVERED ITEM OR SERVICE: HCPCS | Mod: GY | Performed by: OPHTHALMOLOGY

## 2018-06-28 PROCEDURE — 25000125 ZZHC RX 250: Performed by: OPHTHALMOLOGY

## 2018-06-28 PROCEDURE — 71000013 ZZH RECOVERY PHASE 1 LEVEL 1 EA ADDTL HR: Performed by: OPHTHALMOLOGY

## 2018-06-28 PROCEDURE — 88331 PATH CONSLTJ SURG 1 BLK 1SPC: CPT | Performed by: OPHTHALMOLOGY

## 2018-06-28 PROCEDURE — 40000170 ZZH STATISTIC PRE-PROCEDURE ASSESSMENT II: Performed by: OPHTHALMOLOGY

## 2018-06-28 RX ORDER — FENTANYL CITRATE 50 UG/ML
INJECTION, SOLUTION INTRAMUSCULAR; INTRAVENOUS PRN
Status: DISCONTINUED | OUTPATIENT
Start: 2018-06-28 | End: 2018-06-28

## 2018-06-28 RX ORDER — ONDANSETRON 2 MG/ML
4 INJECTION INTRAMUSCULAR; INTRAVENOUS EVERY 30 MIN PRN
Status: DISCONTINUED | OUTPATIENT
Start: 2018-06-28 | End: 2018-06-28 | Stop reason: HOSPADM

## 2018-06-28 RX ORDER — HYDROCODONE BITARTRATE AND ACETAMINOPHEN 5; 325 MG/1; MG/1
1 TABLET ORAL EVERY 6 HOURS PRN
Qty: 10 TABLET | Refills: 0 | Status: SHIPPED | OUTPATIENT
Start: 2018-06-28 | End: 2022-04-13

## 2018-06-28 RX ORDER — TETRACAINE HYDROCHLORIDE 5 MG/ML
SOLUTION OPHTHALMIC PRN
Status: DISCONTINUED | OUTPATIENT
Start: 2018-06-28 | End: 2018-06-28 | Stop reason: HOSPADM

## 2018-06-28 RX ORDER — ERYTHROMYCIN 5 MG/G
OINTMENT OPHTHALMIC
Qty: 3.5 G | Refills: 0 | Status: SHIPPED | OUTPATIENT
Start: 2018-06-28 | End: 2022-04-13

## 2018-06-28 RX ORDER — SODIUM CHLORIDE, SODIUM LACTATE, POTASSIUM CHLORIDE, CALCIUM CHLORIDE 600; 310; 30; 20 MG/100ML; MG/100ML; MG/100ML; MG/100ML
INJECTION, SOLUTION INTRAVENOUS CONTINUOUS PRN
Status: DISCONTINUED | OUTPATIENT
Start: 2018-06-28 | End: 2018-06-28

## 2018-06-28 RX ORDER — NALOXONE HYDROCHLORIDE 0.4 MG/ML
.1-.4 INJECTION, SOLUTION INTRAMUSCULAR; INTRAVENOUS; SUBCUTANEOUS
Status: DISCONTINUED | OUTPATIENT
Start: 2018-06-28 | End: 2018-06-28 | Stop reason: HOSPADM

## 2018-06-28 RX ORDER — HYDROMORPHONE HYDROCHLORIDE 1 MG/ML
.3-.5 INJECTION, SOLUTION INTRAMUSCULAR; INTRAVENOUS; SUBCUTANEOUS EVERY 10 MIN PRN
Status: DISCONTINUED | OUTPATIENT
Start: 2018-06-28 | End: 2018-06-28 | Stop reason: HOSPADM

## 2018-06-28 RX ORDER — MEPERIDINE HYDROCHLORIDE 25 MG/ML
12.5 INJECTION INTRAMUSCULAR; INTRAVENOUS; SUBCUTANEOUS
Status: DISCONTINUED | OUTPATIENT
Start: 2018-06-28 | End: 2018-06-28 | Stop reason: HOSPADM

## 2018-06-28 RX ORDER — ERYTHROMYCIN 5 MG/G
OINTMENT OPHTHALMIC PRN
Status: DISCONTINUED | OUTPATIENT
Start: 2018-06-28 | End: 2018-06-28 | Stop reason: HOSPADM

## 2018-06-28 RX ORDER — LIDOCAINE HYDROCHLORIDE AND EPINEPHRINE 10; 10 MG/ML; UG/ML
INJECTION, SOLUTION INFILTRATION; PERINEURAL PRN
Status: DISCONTINUED | OUTPATIENT
Start: 2018-06-28 | End: 2018-06-28 | Stop reason: HOSPADM

## 2018-06-28 RX ORDER — FENTANYL CITRATE 50 UG/ML
25-50 INJECTION, SOLUTION INTRAMUSCULAR; INTRAVENOUS
Status: DISCONTINUED | OUTPATIENT
Start: 2018-06-28 | End: 2018-06-28 | Stop reason: HOSPADM

## 2018-06-28 RX ORDER — HYDROCODONE BITARTRATE AND ACETAMINOPHEN 5; 325 MG/1; MG/1
1 TABLET ORAL ONCE
Status: COMPLETED | OUTPATIENT
Start: 2018-06-28 | End: 2018-06-28

## 2018-06-28 RX ORDER — FLUOROMETHOLONE 0.1 %
SUSPENSION, DROPS(FINAL DOSAGE FORM)(ML) OPHTHALMIC (EYE)
Qty: 1 BOTTLE | Refills: 0 | Status: SHIPPED | OUTPATIENT
Start: 2018-06-28 | End: 2022-04-13

## 2018-06-28 RX ORDER — SODIUM CHLORIDE, SODIUM LACTATE, POTASSIUM CHLORIDE, CALCIUM CHLORIDE 600; 310; 30; 20 MG/100ML; MG/100ML; MG/100ML; MG/100ML
INJECTION, SOLUTION INTRAVENOUS CONTINUOUS
Status: DISCONTINUED | OUTPATIENT
Start: 2018-06-28 | End: 2018-06-28 | Stop reason: HOSPADM

## 2018-06-28 RX ORDER — ONDANSETRON 4 MG/1
4 TABLET, ORALLY DISINTEGRATING ORAL EVERY 30 MIN PRN
Status: DISCONTINUED | OUTPATIENT
Start: 2018-06-28 | End: 2018-06-28 | Stop reason: HOSPADM

## 2018-06-28 RX ORDER — LIDOCAINE HYDROCHLORIDE 20 MG/ML
INJECTION, SOLUTION INFILTRATION; PERINEURAL PRN
Status: DISCONTINUED | OUTPATIENT
Start: 2018-06-28 | End: 2018-06-28

## 2018-06-28 RX ORDER — FENTANYL CITRATE 50 UG/ML
25-50 INJECTION, SOLUTION INTRAMUSCULAR; INTRAVENOUS EVERY 5 MIN PRN
Status: DISCONTINUED | OUTPATIENT
Start: 2018-06-28 | End: 2018-06-28 | Stop reason: HOSPADM

## 2018-06-28 RX ORDER — PROPOFOL 10 MG/ML
INJECTION, EMULSION INTRAVENOUS PRN
Status: DISCONTINUED | OUTPATIENT
Start: 2018-06-28 | End: 2018-06-28

## 2018-06-28 RX ADMIN — PROPOFOL 10 MG: 10 INJECTION, EMULSION INTRAVENOUS at 09:20

## 2018-06-28 RX ADMIN — FENTANYL CITRATE 25 MCG: 50 INJECTION INTRAMUSCULAR; INTRAVENOUS at 10:23

## 2018-06-28 RX ADMIN — MIDAZOLAM 1 MG: 1 INJECTION INTRAMUSCULAR; INTRAVENOUS at 09:19

## 2018-06-28 RX ADMIN — LIDOCAINE HYDROCHLORIDE 40 MG: 20 INJECTION, SOLUTION INFILTRATION; PERINEURAL at 09:19

## 2018-06-28 RX ADMIN — HYDROCODONE BITARTRATE AND ACETAMINOPHEN 1 TABLET: 5; 325 TABLET ORAL at 10:26

## 2018-06-28 RX ADMIN — LIDOCAINE HYDROCHLORIDE AND EPINEPHRINE 3 ML: 10; 10 INJECTION, SOLUTION INFILTRATION; PERINEURAL at 09:40

## 2018-06-28 RX ADMIN — PROPOFOL 40 MG: 10 INJECTION, EMULSION INTRAVENOUS at 09:19

## 2018-06-28 RX ADMIN — SODIUM CHLORIDE, POTASSIUM CHLORIDE, SODIUM LACTATE AND CALCIUM CHLORIDE: 600; 310; 30; 20 INJECTION, SOLUTION INTRAVENOUS at 09:15

## 2018-06-28 RX ADMIN — FENTANYL CITRATE 50 MCG: 50 INJECTION, SOLUTION INTRAMUSCULAR; INTRAVENOUS at 09:19

## 2018-06-28 RX ADMIN — ERYTHROMYCIN 2 INCH: 5 OINTMENT OPHTHALMIC at 09:40

## 2018-06-28 RX ADMIN — TETRACAINE HYDROCHLORIDE 2 DROP: 5 SOLUTION OPHTHALMIC at 09:20

## 2018-06-28 NOTE — OP NOTE
PREOPERATIVE DIAGNOSIS: Right lower eyelid neoplasm.   POSTOPERATIVE DIAGNOSIS: Right lower eyelid neoplasm.   PROCEDURE: Right lower eyelid wedge resection with frozen section guidance and reconstruction.  ANESTHESIA: Monitored with local infiltration of a 50/50 mixture of 2% lidocaine with epinephrine.  SURGEON: Santa Jenkins MD  ASSISTANT: Desiree Guallpa MD and Robby Chambers MD  COMPLICATIONS: None.   ESTIMATED BLOOD LOSS: Less than 5 mL.   SPECIMENS:   1. Right lower eyelid lesion in formalin   2. Right lower eyelid, medial, inferior and lateral margins for frozen section analysis.   HISTORY AND INDICATIONS: The patient presented with a right lower lid lesion that was slowly enlarging. Clinically, this appeared to be consistent with a basal cell or squamous cell.  After the risks, benefits and alternatives to the proposed procedure were explained, informed consent was obtained.   DESCRIPTION OF PROCEDURE: The patient was brought to the operating room and placed supine on the operating table. IV sedation was given. The  right lower lid and  lateral canthal areas were infiltrated with local anesthetic. He was prepped and draped in the typical sterile ophthalmic fashion. Attention was directed to the right lower lid. The area of the lesion was excised with a Alison scissors. Hemostasis was obtained with high temperature cautery. Medial, lateral and inferior margins were sent for frozen section analysis. These were all negative. The margin was sutured in place with a vertical mattress 6-0 Vicryl suture tied internally at the lid margin and the 6-0 Vicryl suture at the lash line. Interrupted 5-0 Vicryl sutures were used to close the posterior lamella. Skin was closed with interrupted 6-0 Vicryl and 6-0 plain gut sutures. Erythromycin ophthalmic ointment was applied. The patient tolerated the procedure well and left the operating room in stable condition.   SANTA JENKINS MD

## 2018-06-28 NOTE — IP AVS SNAPSHOT
MRN:[id number]                      After Visit Summary   6/28/2018    Kingston Antoine    MRN: [id number]           Thank you!     Thank you for choosing [facility] for your care. Our goal is always to provide you with excellent care. Hearing back from our patients is one way we can continue to improve our services. Please take a few minutes to complete the written survey that you may receive in the mail after you visit with us. Thank you!        Patient Information     Date Of Birth          1945        Designated Caregiver       Most Recent Value    Caregiver    Will someone help with your care after discharge? yes    Name of designated caregiver Saniya ALVAREZ    Phone number of caregiver [phone]      About your hospital stay     You were admitted on:  June 28, 2018 You last received care in the:  [facility]    You were discharged on:  June 28, 2018       Who to Call     For medical emergencies, please call 911.  For non-urgent questions about your medical care, please call your primary care provider or clinic, [phone]  For questions related to your surgery, please call your surgery clinic        Attending Provider     Provider Specialty    Delroy Catherine MD Ophthalmology       Primary Care Provider Office Phone # Fax #    Dong Encarnacion [phone]      After Care Instructions     Discharge Medication Instructions       Do NOT take aspirin or medications containing NSAIDS for 72 hours after procedure.            Ice to affected area       Apply cold pack for 15 minutes on, 15 minutes off, for 48 hours while awake.                  Further instructions from your care team       Post-operative Instructions    Ophthalmic Plastic and Reconstructive Surgery  Delroy Catherine M.D.  Desiree Guallpa M.D.    All instructions apply to the operat  ed eye(s) or eyelid(s)      What to expect after surgery:    Thre will be some swelling, bruising,  and likely a black eye (even into the lower eyelids and cheeks). Also expect crusting and discharge from the eye and/or incisions.     A small amount of surface bleeding is normal for the first 48 hours after surgery.    You may notice some bloody tears for the first few days after surgery. This is normal.    Your eye(s) and eyelid(s) may be painful and tender. This is normal after surgery. Use the pain medication as prescribed. If your pain does not improve despite the medication, contact the office.    Wound care and personal care:    If a patch or bandage has been placed, please leave this in place until seen in clinic. Prevent the bandage from getting wet.     Apply ice compresses 15 minutes on 15 minutes off while awake for the first 2 days after surgery, then switch to warm compresses 4 times a day until seen by your physician.     For warm packs you can place a cup of dry uncooked rice in a clean cotton sock. Place sock in microwave 30 seconds to one minute. Next place the warm sock into a plastic bag and wrap the bag with clean warm wet washcloth and place over operated eye.      You may shower or wash your hair the day after surgery. Do not bathe or go swimming for 1 week to prevent contamination of your wounds.    Do not apply make-up to the eyes or eyelids for 2 weeks after surgery.      Activity restrictions and driving:    Avoid heavy lifting, bending, exercise or strenuous activity for 1 week after surgery.    You may resume other activities and return to work as tolerated.    You may not resume driving until have you stopped using narcotic pain medications(such as Norco, Percocet, Tylenol #3).    Medications:    Restart all your regular home medications and eye drops today. If you take Plavix or Aspirin on a regular basis, wait for 3 days after your surgery before restarting these in order to decrease the risk of bleeding complications.    Avoid aspirin and aspirin-like medications (Motrin, Aleve,  Ibuprofen, Cherie-Gaylord etc) for 5 days to reduce the risk of bleeding. You may take Tylenol (acetaminophen) for pain.    In addition to your home medications, take the following post-operative medications as prescribed by your physician:    Apply antibiotic ointment (erythromycin) to all sutures three times a day, and into the operated eye(s) at night.    Instill eye drops (Maxitrol) four times a day until the bottle finished.    Take 1 to 2 pain pills (norco or tylenol 3 as prescribed) as needed for pain up to every 4 hours.    The pain pills may make you drowsy. You must not drive a car, operate heavy machinery or drink alcohol while taking them.    The pain pills may cause constipation and nausea. Take them with some food to prevent a stomach upset. If you continue to experience nausea, call your physician.      WARNING: All the prescription pain medications listed above contain Tylenol (acetaminophen). You must not take more than 4,000 mg of acetaminophen per 24-hour period. This is equivalent to 6 tablets of Darvocet, 8 tablets of Vicodin, or 12 tablets of Norco, Percocet or Tylenol #3. If you take other over-the-counter medications containing acetaminophen, you must take the amount of acetaminophen into account and reduce the number of prescribed pain pills accordingly.    Contact information and follow-up:    Return to the Eye Clinic for a follow-up appointment with your physician as  scheduled. If no appointment has been scheduled, call [phone] for an  appointment with Dr. Catherine within 1 to 2 weeks from your date of surgery.    You can follow up with Dr. Deon Webster locally if you prefer. You will need to call for an appointment in 1-2 weeks.   Address: [address] [place] [address] [address], [place] [address]   Phone: [phone]      For severe pain, bleeding, or loss of vision, call the Eye Clinic at [phone].    An on call person can be reached after hours for concerns. The on call doctor should  not call in medication refill requests after hours or on weekends, so please plan accordingly. An effort has been made to provide adequate pain medications following every surgery, and refills will not be provided in most instances. Narcotic pain medications cannot be called in.       Same Day Surgery Discharge Instructions for  Sedation and General Anesthesia       It's not unusual to feel dizzy, light-headed or faint for up to 24 hours after surgery or while taking pain medication.  If you have these symptoms: sit for a few minutes before standing and have someone assist you when you get up to walk or use the bathroom.      You should rest and relax for the next 24 hours. We recommend you make arrangements to have an adult stay with you for at least 24 hours after your discharge.  Avoid hazardous and strenuous activity.      DO NOT DRIVE any vehicle or operate mechanical equipment for 24 hours following the end of your surgery.  Even though you may feel normal, your reactions may be affected by the medication you have received.      Do not drink alcoholic beverages for 24 hours following surgery.       Slowly progress to your regular diet as you feel able. It's not unusual to feel nauseated and/or vomit after receiving anesthesia.  If you develop these symptoms, drink clear liquids (apple juice, ginger ale, broth, 7-up, etc. ) until you feel better.  If your nausea and vomiting persists for 24 hours, please notify your surgeon.        All narcotic pain medications, along with inactivity and anesthesia, can cause constipation. Drinking plenty of liquids and increasing fiber intake will help.      For any questions of a medical nature, call your surgeon.      Do not make important decisions for 24 hours.      If you had general anesthesia, you may have a sore throat for a couple of days related to the breathing tube used during surgery.  You may use Cepacol lozenges to help with this discomfort.  If it worsens or if  "you develop a fever, contact your surgeon.       If you feel your pain is not well managed with the pain medications prescribed by your surgeon, please contact your surgeon's office to let them know so they can address your concerns.             Pending Results     Date and Time Order Name Status Description    6/28/2018 0928 Surgical pathology exam In process             Admission Information     Date & Time Provider Department Dept. Phone    6/28/2018 Delroy Catherine MD [facility] Same Day Surgery [phone]      Your Vitals Were     Blood Pressure Pulse Temperature Respirations Height Weight    120/64 52 98.7  F (37.1  C) (Temporal) 18 1.727 m (5' 8\") 85.4 kg (188 lb 3.2 oz)    Pulse Oximetry BMI (Body Mass Index)                94% 28.62 kg/m2          MyChart Information     [employer] gives you secure access to your electronic health record. If you see a primary care provider, you can also send messages to your care team and make appointments. If you have questions, please call your primary care clinic.  If you do not have a primary care provider, please call [phone] and they will assist you.        Care EveryWhere ID     This is your Care EveryWhere ID. This could be used by other organizations to access your [facility] medical records  [id number]        Equal Access to Services     LENNOX WILBURN : Jean geeo Somakenna, waaxda luqadaha, qaybta kaalmada adeegyada, amy smith. So [facility] [phone].    ATENCIÓN: Si habla español, tiene a manzanares disposición servicios gratuitos de asistencia lingüística. Llame al [phone].    We comply with applicable federal civil rights laws and [place] laws. We do not discriminate on the basis of race, color, national origin, age, disability, sex, sexual orientation, or gender identity.               Review of your medicines      START taking        Dose / Directions    erythromycin ophthalmic ointment   Commonly known as:  " ROMYCIN   Used for:  Postoperative eye state        Apply to skin incisions three times daily and into the eye at bedtime.   Quantity:  3.5 g   Refills:  0       fluorometholone 0.1 % ophthalmic susp   Commonly known as:  FML LIQUIFILM   Used for:  Postoperative eye state        Put one drop in the surgical eye(s) 4 times daily for 10 days   Quantity:  1 Bottle   Refills:  0       HYDROcodone-acetaminophen 5-325 MG per tablet   Commonly known as:  NORCO   Used for:  Postoperative eye state   Notes to Patient:  You were given 1 tablet at approximately 10:30 at the hospital.        Dose:  1 tablet   Take 1 tablet by mouth every 6 hours as needed for pain Maximum of 4000 mg of acetaminophen in 24 hours.   Quantity:  10 tablet   Refills:  0         CONTINUE these medicines which have NOT CHANGED        Dose / Directions    amLODIPine 5 MG tablet   Commonly known as:  NORVASC        Dose:  5 mg   Take 1 tablet (5 mg) by mouth daily   Quantity:  30 tablet   Refills:  0       * carbidopa-levodopa  MG per CR tablet   Commonly known as:  SINEMET CR        Dose:  1 tablet   Take 1 tablet by mouth At Bedtime   Refills:  0       * carbidopa-levodopa  MG per tablet   Commonly known as:  SINEMET   Used for:  Parkinson's disease (H)        Take one tablet every 3 hours while awake.   Quantity:  720 tablet   Refills:  3       FOLBIC 2.5-25-2 MG Tabs per tablet   Generic drug:  fa-pyridoxine-cyancobalamin        Dose:  2 tablet   Take 2 tablets by mouth daily   Refills:  0       labetalol 100 MG tablet   Commonly known as:  NORMODYNE        Dose:  100 mg   Take 1 tablet (100 mg) by mouth 2 times daily   Refills:  0       losartan 100 MG tablet   Commonly known as:  COZAAR        Dose:  100 mg   Take 1 tablet (100 mg) by mouth daily   Refills:  0       omeprazole 20 MG CR capsule   Commonly known as:  priLOSEC        Dose:  40 mg   Take 2 capsules (40 mg) by mouth daily   Refills:  0       potassium chloride 10 MEQ tablet    Commonly known as:  K-TAB,KLOR-CON        Dose:  30 mEq   Take 3 tablets (30 mEq) by mouth 2 times daily   Quantity:  90 tablet   Refills:  0       triamcinolone 0.5 % cream   Commonly known as:  KENALOG        Topical 2 x daily a thin layer to affected areas   Refills:  0       triamterene-hydrochlorothiazide 37.5-25 MG per tablet   Commonly known as:  MAXZIDE-25        Dose:  1 tablet   Take 1 tablet by mouth daily   Refills:  0       * Notice:  This list has 2 medication(s) that are the same as other medications prescribed for you. Read the directions carefully, and ask your doctor or other care provider to review them with you.         Where to get your medicines      These medications were sent to [facility] - [address]  [address] [place] [address]     Phone:  [phone]     erythromycin ophthalmic ointment    fluorometholone 0.1 % ophthalmic susp         Some of these will need a paper prescription and others can be bought over the counter. Ask your nurse if you have questions.     Bring a paper prescription for each of these medications     HYDROcodone-acetaminophen 5-325 MG per tablet                Protect others around you: Learn how to safely use, store and throw away your medicines at www.disposemymeds.org.        Information about OPIOIDS     PRESCRIPTION OPIOIDS: WHAT YOU NEED TO KNOW   We gave you an opioid (narcotic) pain medicine. It is important to manage your pain, but opioids are not always the best choice. You should first try all the other options your care team gave you. Take this medicine for as short a time (and as few doses) as possible.     These medicines have risks:    DO NOT drive when on new or higher doses of pain medicine. These medicines can affect your alertness and reaction times, and you could be arrested for driving under the influence (DUI). If you need to use opioids long-term, talk to your care team about driving.    DO  NOT operate heave machinery    DO NOT do any other dangerous activities while taking these medicines.     DO NOT drink any alcohol while taking these medicines.      If the opioid prescribed includes acetaminophen, DO NOT take with any other medicines that contain acetaminophen. Read all labels carefully. Look for the word  acetaminophen  or  Tylenol.  Ask your pharmacist if you have questions or are unsure.    You can get addicted to pain medicines, especially if you have a history of addiction (chemical, alcohol or substance dependence). Talk to your care team about ways to reduce this risk.    Store your pills in a secure place, locked if possible. We will not replace any lost or stolen medicine. If you don t finish your medicine, please throw away (dispose) as directed by your pharmacist. The [place] [employer]ution [employer] has more information about safe disposal: https://www.pca.[facility].[place].us/living-green/managing-unwanted-medications.     All opioids tend to cause constipation. Drink plenty of water and eat foods that have a lot of fiber, such as fruits, vegetables, prune juice, apple juice and high-fiber cereal. Take a laxative (Miralax, milk of magnesia, Colace, Senna) if you don t move your bowels at least every other day.              Medication List: This is a list of all your medications and when to take them. Check marks below indicate your daily home schedule. Keep this list as a reference.      Medications           Morning Afternoon Evening Bedtime As Needed    amLODIPine 5 MG tablet   Commonly known as:  NORVASC   Take 1 tablet (5 mg) by mouth daily                                * carbidopa-levodopa  MG per CR tablet   Commonly known as:  SINEMET CR   Take 1 tablet by mouth At Bedtime                                * carbidopa-levodopa  MG per tablet   Commonly known as:  SINEMET   Take one tablet every 3 hours while awake.                                erythromycin ophthalmic  ointment   Commonly known as:  ROMYCIN   Apply to skin incisions three times daily and into the eye at bedtime.   Last time this was given:  2 inches on 6/28/2018  9:40 AM                                fluorometholone 0.1 % ophthalmic susp   Commonly known as:  FML LIQUIFILM   Put one drop in the surgical eye(s) 4 times daily for 10 days                                FOLBIC 2.5-25-2 MG Tabs per tablet   Take 2 tablets by mouth daily   Generic drug:  fa-pyridoxine-cyancobalamin                                HYDROcodone-acetaminophen 5-325 MG per tablet   Commonly known as:  NORCO   Take 1 tablet by mouth every 6 hours as needed for pain Maximum of 4000 mg of acetaminophen in 24 hours.   Last time this was given:  1 tablet on 6/28/2018 10:26 AM   Notes to Patient:  You were given 1 tablet at approximately 10:30 at the hospital.                                labetalol 100 MG tablet   Commonly known as:  NORMODYNE   Take 1 tablet (100 mg) by mouth 2 times daily                                losartan 100 MG tablet   Commonly known as:  COZAAR   Take 1 tablet (100 mg) by mouth daily                                omeprazole 20 MG CR capsule   Commonly known as:  priLOSEC   Take 2 capsules (40 mg) by mouth daily                                potassium chloride 10 MEQ tablet   Commonly known as:  K-TAB,KLOR-CON   Take 3 tablets (30 mEq) by mouth 2 times daily                                triamcinolone 0.5 % cream   Commonly known as:  KENALOG   Topical 2 x daily a thin layer to affected areas                                triamterene-hydrochlorothiazide 37.5-25 MG per tablet   Commonly known as:  MAXZIDE-25   Take 1 tablet by mouth daily                                * Notice:  This list has 2 medication(s) that are the same as other medications prescribed for you. Read the directions carefully, and ask your doctor or other care provider to review them with you.

## 2018-06-28 NOTE — ANESTHESIA PREPROCEDURE EVALUATION
Anesthesia Evaluation     . Pt has had prior anesthetic.     No history of anesthetic complications          ROS/MED HX    ENT/Pulmonary:       Neurologic:     (+)migraines, Parkinson's disease     Cardiovascular:     (+) Dyslipidemia, hypertension----. : . . . :. .       METS/Exercise Tolerance:     Hematologic:         Musculoskeletal:         GI/Hepatic:     (+) GERD Asymptomatic on medication,       Renal/Genitourinary:     (+) chronic renal disease, type: CRI,       Endo:     (+) type II DM .   (-) Type I DM   Psychiatric:         Infectious Disease:   (+) Other Infectious Disease       Malignancy:         Other:                     Physical Exam  Normal systems: cardiovascular and dental    Airway   Mallampati: I  TM distance: >3 FB  Neck ROM: full    Dental     Cardiovascular   Rhythm and rate: regular and normal      Pulmonary    breath sounds clear to auscultation                    Anesthesia Plan      History & Physical Review  History and physical reviewed and following examination; no interval change.    ASA Status:  3 .    NPO Status:  > 8 hours    Plan for MAC with Intravenous induction.   PONV prophylaxis:  Ondansetron (or other 5HT-3) and Dexamethasone or Solumedrol       Postoperative Care  Postoperative pain management:  IV analgesics and Oral pain medications.      Consents  Anesthetic plan, risks, benefits and alternatives discussed with:  Patient..                          .

## 2018-06-28 NOTE — IP AVS SNAPSHOT
[facility] Same Day [facility]    [address]    [place] [address]    Phone:  [phone]    Fax:  [phone]                                       After Visit Summary   6/28/2018    Kingston Antoine    MRN: [id number]           After Visit Summary Signature Page     I have received my discharge instructions, and my questions have been answered. I have discussed any challenges I see with this plan with the nurse or doctor.    ..........................................................................................................................................  Patient/Patient Representative Signature      ..........................................................................................................................................  Patient Representative Print Name and Relationship to Patient    ..................................................               ................................................  Date                                            Time    ..........................................................................................................................................  Reviewed by Signature/Title    ...................................................              ..............................................  Date                                                            Time

## 2018-06-28 NOTE — ANESTHESIA POSTPROCEDURE EVALUATION
Patient: Kingston Antoine    Procedure(s):  RIGHT LOWER LID WEDGE RESECTION WITH FROZEN SECTION CONTROL AND RECONSTRUCTION - Wound Class: I-Clean    Diagnosis:RIGHT LOWER LID LESION  Diagnosis Additional Information: No value filed.    Anesthesia Type:  MAC    Note:  Anesthesia Post Evaluation    Patient location during evaluation: PACU  Patient participation: Able to fully participate in evaluation  Level of consciousness: awake  Pain management: adequate  Airway patency: patent  Cardiovascular status: acceptable  Respiratory status: acceptable  Hydration status: acceptable  PONV: none     Anesthetic complications: None          Last vitals:  Vitals:    06/28/18 0746   BP: 136/71   Pulse: 52   Resp: 20   Temp: 36.3  C (97.3  F)   SpO2: 98%         Electronically Signed By: Pepe Monsivais MD  June 28, 2018  10:04 AM

## 2018-06-28 NOTE — ANESTHESIA CARE TRANSFER NOTE
Patient: Kingston Antoine    Procedure(s):  RIGHT LOWER LID WEDGE RESECTION WITH FROZEN SECTION CONTROL AND RECONSTRUCTION - Wound Class: I-Clean    Diagnosis: RIGHT LOWER LID LESION  Diagnosis Additional Information: No value filed.    Anesthesia Type:   MAC     Note:  Airway :Room Air  Patient transferred to:PACU  Comments: 956:  To par awakeHandoff Report: Identifed the Patient, Identified the Reponsible Provider, Reviewed the pertinent medical history, Discussed the surgical course, Reviewed Intra-OP anesthesia mangement and issues during anesthesia, Set expectations for post-procedure period and Allowed opportunity for questions and acknowledgement of understanding      Vitals: (Last set prior to Anesthesia Care Transfer)    CRNA VITALS  6/28/2018 0926 - 6/28/2018 0956      6/28/2018             Resp Rate (set): 10                Electronically Signed By: ANEUDY Cannon CRNA  June 28, 2018  9:56 AM

## 2018-06-28 NOTE — DISCHARGE INSTRUCTIONS
Post-operative Instructions    Ophthalmic Plastic and Reconstructive Surgery  Delroy Catherine M.D.  Desiree Guallpa M.D.    All instructions apply to the operat  ed eye(s) or eyelid(s)      What to expect after surgery:    Thre will be some swelling, bruising, and likely a black eye (even into the lower eyelids and cheeks). Also expect crusting and discharge from the eye and/or incisions.     A small amount of surface bleeding is normal for the first 48 hours after surgery.    You may notice some bloody tears for the first few days after surgery. This is normal.    Your eye(s) and eyelid(s) may be painful and tender. This is normal after surgery. Use the pain medication as prescribed. If your pain does not improve despite the medication, contact the office.    Wound care and personal care:    If a patch or bandage has been placed, please leave this in place until seen in clinic. Prevent the bandage from getting wet.     Apply ice compresses 15 minutes on 15 minutes off while awake for the first 2 days after surgery, then switch to warm compresses 4 times a day until seen by your physician.     For warm packs you can place a cup of dry uncooked rice in a clean cotton sock. Place sock in microwave 30 seconds to one minute. Next place the warm sock into a plastic bag and wrap the bag with clean warm wet washcloth and place over operated eye.      You may shower or wash your hair the day after surgery. Do not bathe or go swimming for 1 week to prevent contamination of your wounds.    Do not apply make-up to the eyes or eyelids for 2 weeks after surgery.      Activity restrictions and driving:    Avoid heavy lifting, bending, exercise or strenuous activity for 1 week after surgery.    You may resume other activities and return to work as tolerated.    You may not resume driving until have you stopped using narcotic pain medications(such as Norco, Percocet, Tylenol #3).    Medications:    Restart all your regular home  medications and eye drops today. If you take Plavix or Aspirin on a regular basis, wait for 3 days after your surgery before restarting these in order to decrease the risk of bleeding complications.    Avoid aspirin and aspirin-like medications (Motrin, Aleve, Ibuprofen, Cherie-[place] etc) for 5 days to reduce the risk of bleeding. You may take Tylenol (acetaminophen) for pain.    In addition to your home medications, take the following post-operative medications as prescribed by your physician:    Apply antibiotic ointment (erythromycin) to all sutures three times a day, and into the operated eye(s) at night.    Instill eye drops (Maxitrol) four times a day until the bottle finished.    Take 1 to 2 pain pills (norco or tylenol 3 as prescribed) as needed for pain up to every 4 hours.    The pain pills may make you drowsy. You must not drive a car, operate heavy machinery or drink alcohol while taking them.    The pain pills may cause constipation and nausea. Take them with some food to prevent a stomach upset. If you continue to experience nausea, call your physician.      WARNING: All the prescription pain medications listed above contain Tylenol (acetaminophen). You must not take more than 4,000 mg of acetaminophen per 24-hour period. This is equivalent to 6 tablets of Darvocet, 8 tablets of Vicodin, or 12 tablets of Norco, Percocet or Tylenol #3. If you take other over-the-counter medications containing acetaminophen, you must take the amount of acetaminophen into account and reduce the number of prescribed pain pills accordingly.    Contact information and follow-up:    Return to the Eye Clinic for a follow-up appointment with your physician as  scheduled. If no appointment has been scheduled, call [phone] for an  appointment with Dr. Catherine within 1 to 2 weeks from your date of surgery.    You can follow up with Dr. Deon Webster locally if you prefer. You will need to call for an appointment in 1-2 weeks.    Address: [address] [place] [address] [address], [place] [address]   Phone: [phone]      For severe pain, bleeding, or loss of vision, call the Eye Clinic at [phone].    An on call person can be reached after hours for concerns. The on call doctor should not call in medication refill requests after hours or on weekends, so please plan accordingly. An effort has been made to provide adequate pain medications following every surgery, and refills will not be provided in most instances. Narcotic pain medications cannot be called in.       Same Day Surgery Discharge Instructions for  Sedation and General Anesthesia       It's not unusual to feel dizzy, light-headed or faint for up to 24 hours after surgery or while taking pain medication.  If you have these symptoms: sit for a few minutes before standing and have someone assist you when you get up to walk or use the bathroom.      You should rest and relax for the next 24 hours. We recommend you make arrangements to have an adult stay with you for at least 24 hours after your discharge.  Avoid hazardous and strenuous activity.      DO NOT DRIVE any vehicle or operate mechanical equipment for 24 hours following the end of your surgery.  Even though you may feel normal, your reactions may be affected by the medication you have received.      Do not drink alcoholic beverages for 24 hours following surgery.       Slowly progress to your regular diet as you feel able. It's not unusual to feel nauseated and/or vomit after receiving anesthesia.  If you develop these symptoms, drink clear liquids (apple juice, ginger ale, broth, 7-up, etc. ) until you feel better.  If your nausea and vomiting persists for 24 hours, please notify your surgeon.        All narcotic pain medications, along with inactivity and anesthesia, can cause constipation. Drinking plenty of liquids and increasing fiber intake will help.      For any questions of a medical nature, call your surgeon.      Do  not make important decisions for 24 hours.      If you had general anesthesia, you may have a sore throat for a couple of days related to the breathing tube used during surgery.  You may use Cepacol lozenges to help with this discomfort.  If it worsens or if you develop a fever, contact your surgeon.       If you feel your pain is not well managed with the pain medications prescribed by your surgeon, please contact your surgeon's office to let them know so they can address your concerns.

## 2018-06-29 LAB — COPATH REPORT: NORMAL

## 2018-07-02 NOTE — PROGRESS NOTES
WAIS-IV    Raw              Age Scaled   Similarities   25    11   Comprehension        31    14   Letter Num. Seq.   22    13   Digit Span   36    16   Matrix Reasoning   10      9     WIDE RANGE ACHIEVEMENT TEST - 4    Standard  %tile              Grade      Score  Rank              Equiv.  Reading   101     53           12.5     WECHSLER MEMORY SCALE - REVISED    Raw Score        MAS         Information & Orientation        14   Logical Memory  Immed.   30    14    Logical Memory  30 min.   18    10      MAS VERBAL LEARNING TEST - REVISED  Form 4                                              Raw                  T                                        Trial 1               7   Trial 2               8   Trial 3     12   Total 1-3     27       57   Learning  5   Delay  8       49   Percent Retained     67       41   True Positives     11   Discrimination Index  9       43   False Positives  2     BRIEF VISUAL MEMORY TEST - REVISED  Form 5                                            Raw                  T                                        Trial 1               3   Trial 2               9   Trial 3  5   Total 1-3     17       45   Learning  6       62   Delay  6       43   Percent Retained     67     6-10 (%ile)  True Positives  6   Discrimination Index  6     >16 (%ile)  False Positives  0       [place] NAMING TEST  Score (Correct+Stim cue)        57      MAS   11       # correct Stimulus Cue:             0           # correct Phonemic Cue:            2        D-KEFS VERBAL FLUENCY  Alternate    Raw              Age Scaled   Letter Fluency   45     13    Category Fluency        37    12    Switching Fluency    Total Correct   10      8    Switching Acc.   10      9     TRAIL MAKING TEST         Seconds         Errors           MAS                  A     53   0     7  .  B      100   0     8  .    STROOP                    Raw   +  Thanh =     Total      MAS        Word  77  +  - =     77     6    Color  55   +  - =     55     8    C-W  27  +  - =     27     8      CLOCK DRAWING  Command  2 /3    Copy   3   /3       [place] CARD SORTING TEST - 64  # Categories      5             >16    %ile  # persev err.          4         T        >80        Conc lev resp.       58        T      >80         FMS        0           GRIFFITH JUDGMENT OF LINE ORIENTATION  Form H  Raw     25  MAS          12     DEMENTIA RATING SCALE - 2 -Alternate    Raw MAS Raw     MAS   Attention  37   13  Concept  39    13    Init/Psv  37   12  Memory  22      8   Construct    6   10  Total   141/144   12     PURVIS DEPRESSION INVENTORY - 2: 5  APATHY SCALE: 10    [facility] = [place] Older Adult Normative Study Age & Ed. Adj. Scaled Score

## 2019-10-01 NOTE — TELEPHONE ENCOUNTER
Per Dr. Liriano, called pt to discuss the DBS workup appointments and our DBS class. Left my direct number on  and requested that he call back at at his earliest convenience.   
Detail Level: Generalized
Detail Level: Zone

## 2019-10-02 ENCOUNTER — HEALTH MAINTENANCE LETTER (OUTPATIENT)
Age: 74
End: 2019-10-02

## 2019-12-16 ENCOUNTER — HEALTH MAINTENANCE LETTER (OUTPATIENT)
Age: 74
End: 2019-12-16

## 2020-03-22 ENCOUNTER — HEALTH MAINTENANCE LETTER (OUTPATIENT)
Age: 75
End: 2020-03-22

## 2021-01-15 ENCOUNTER — HEALTH MAINTENANCE LETTER (OUTPATIENT)
Age: 76
End: 2021-01-15

## 2021-05-15 ENCOUNTER — HEALTH MAINTENANCE LETTER (OUTPATIENT)
Age: 76
End: 2021-05-15

## 2021-09-04 ENCOUNTER — HEALTH MAINTENANCE LETTER (OUTPATIENT)
Age: 76
End: 2021-09-04

## 2021-12-25 ENCOUNTER — HEALTH MAINTENANCE LETTER (OUTPATIENT)
Age: 76
End: 2021-12-25

## 2022-02-19 ENCOUNTER — HEALTH MAINTENANCE LETTER (OUTPATIENT)
Age: 77
End: 2022-02-19

## 2022-03-22 DIAGNOSIS — Z11.59 ENCOUNTER FOR SCREENING FOR OTHER VIRAL DISEASES: Primary | ICD-10-CM

## 2022-03-31 ENCOUNTER — TRANSFERRED RECORDS (OUTPATIENT)
Dept: MULTI SPECIALTY CLINIC | Facility: CLINIC | Age: 77
End: 2022-03-31
Payer: MEDICARE

## 2022-03-31 LAB
ALBUMIN (URINE) MG/SPEC: 16.7 MG/L
ALBUMIN/CREATININE RATIO: 20 MG/G CREAT
ALT SERPL-CCNC: 13 U/L (ref 14–63)
AST SERPL-CCNC: 25 U/L (ref 15–37)
CHOLESTEROL (EXTERNAL): 147 MG/DL (ref 100–200)
CREATININE (EXTERNAL): 0.89 MG/DL (ref 0.67–1.17)
CREATININE (URINE): 85 MG/DL (ref 40–278)
GFR ESTIMATED (EXTERNAL): >60 ML/MIN/1.73M*2
GLUCOSE (EXTERNAL): 122 MG/DL (ref 74–100)
HBA1C MFR BLD: 7.1 % (ref 4.8–5.6)
HDLC SERPL-MCNC: 57 MG/DL (ref 35–60)
LDL CHOLESTEROL CALCULATED (EXTERNAL): 77 MG/DL
NON HDL CHOLESTEROL (EXTERNAL): 90 MG/DL
POTASSIUM (EXTERNAL): 5 MMOL/L (ref 3.5–5.1)
TRIGLYCERIDES (EXTERNAL): 65 MG/DL (ref 35–150)
TSH SERPL-ACNC: 0.74 ULU/ML (ref 0.35–3.74)

## 2022-04-11 ENCOUNTER — LAB (OUTPATIENT)
Dept: URGENT CARE | Facility: URGENT CARE | Age: 77
End: 2022-04-11
Attending: OPHTHALMOLOGY
Payer: MEDICARE

## 2022-04-11 DIAGNOSIS — Z11.59 ENCOUNTER FOR SCREENING FOR OTHER VIRAL DISEASES: ICD-10-CM

## 2022-04-11 LAB — SARS-COV-2 RNA RESP QL NAA+PROBE: NEGATIVE

## 2022-04-11 PROCEDURE — U0003 INFECTIOUS AGENT DETECTION BY NUCLEIC ACID (DNA OR RNA); SEVERE ACUTE RESPIRATORY SYNDROME CORONAVIRUS 2 (SARS-COV-2) (CORONAVIRUS DISEASE [COVID-19]), AMPLIFIED PROBE TECHNIQUE, MAKING USE OF HIGH THROUGHPUT TECHNOLOGIES AS DESCRIBED BY CMS-2020-01-R: HCPCS

## 2022-04-11 PROCEDURE — U0005 INFEC AGEN DETEC AMPLI PROBE: HCPCS

## 2022-04-13 ENCOUNTER — ANESTHESIA EVENT (OUTPATIENT)
Dept: SURGERY | Facility: CLINIC | Age: 77
End: 2022-04-13
Payer: MEDICARE

## 2022-04-13 RX ORDER — GINSENG 100 MG
CAPSULE ORAL DAILY
COMMUNITY
End: 2024-06-24

## 2022-04-13 RX ORDER — SILDENAFIL 100 MG/1
100 TABLET, FILM COATED ORAL PRN
COMMUNITY
End: 2024-06-24

## 2022-04-13 RX ORDER — CARBIDOPA AND LEVODOPA 50; 200 MG/1; MG/1
1.5 TABLET, EXTENDED RELEASE ORAL 2 TIMES DAILY
COMMUNITY

## 2022-04-13 RX ORDER — SOTALOL HYDROCHLORIDE 80 MG/1
40 TABLET ORAL EVERY 12 HOURS
COMMUNITY

## 2022-04-13 RX ORDER — PANTOPRAZOLE SODIUM 40 MG/1
40 TABLET, DELAYED RELEASE ORAL DAILY
COMMUNITY

## 2022-04-13 ASSESSMENT — ENCOUNTER SYMPTOMS: DYSRHYTHMIAS: 1

## 2022-04-13 NOTE — ANESTHESIA PREPROCEDURE EVALUATION
Anesthesia Pre-Procedure Evaluation    Patient: Kingston Antoine   MRN: [id number] : 1945        Procedure : Procedure(s):  LEFT LOWER LID WEDGE WITH FROXEN SECTION CONTROL          Past Medical History:   Diagnosis Date     Antiplatelet or antithrombotic long-term use      Arrhythmia     LONGSTANDING PERSISTENT ATRIAL FIBRILLATION     Chronic kidney disease      CKD (chronic kidney disease) stage 3, GFR 30-59 ml/min (H)      Diabetes type 2, controlled (H)      HTN (hypertension)      Migraine      Parkinson disease (H)       Past Surgical History:   Procedure Laterality Date     ------------OTHER-------------      anal fistula repair     ARTHROSCOPY KNEE       cholecystectomy       COLONOSCOPY       EXCISE LESION EYELID Right 2018    Procedure: EXCISE LESION EYELID;  RIGHT LOWER LID WEDGE RESECTION WITH FROZEN SECTION CONTROL AND RECONSTRUCTION;  Surgeon: Delroy Catherine MD;  Location: [facility]     GI SURGERY      EGD     HERNIA REPAIR       TONSILLECTOMY & ADENOIDECTOMY       TUNA        Allergies   Allergen Reactions     Metoclopramide      Streptogramins      PN: streptomyacins     Streptomycin Hives     No Clinical Screening - See Comments Anxiety     Other reaction(s): Sedation      Social History     Tobacco Use     Smoking status: Never Smoker     Smokeless tobacco: Never Used   Substance Use Topics     Alcohol use: Yes     Comment: 1 beer a day      Wt Readings from Last 1 Encounters:   18 85.4 kg (188 lb 3.2 oz)        Anesthesia Evaluation   Pt has had prior anesthetic.     No history of anesthetic complications       ROS/MED HX  ENT/Pulmonary:  - neg pulmonary ROS     Neurologic: Comment: H/o Parkinson's Disease - on  Carbidopa/levodopa      Cardiovascular: Comment: 2019 Echo:      Final Impressions:    1. Grade II PFO by bubble study.    2. LVEF estimate 60-65%. Normal LV size and wall thickness.    3. Normal RV size and global function.    4. No significant valvular abnormalities.     5. Normal PA systolic and RA pressure estimates.    6. Mildly dilated ascending aorta (3.9 cm).        (+) hypertension-----Taking blood thinners Pt has received instructions: dysrhythmias, a-fib,     METS/Exercise Tolerance:     Hematologic:  - neg hematologic  ROS     Musculoskeletal:  - neg musculoskeletal ROS     GI/Hepatic: Comment: H/o hematuria.    (+) GERD,     Renal/Genitourinary: Comment: Cr. 0.89    (+) renal disease, type: CRI,     Endo:     (+) type II DM,     Psychiatric/Substance Use:  - neg psychiatric ROS     Infectious Disease:  - neg infectious disease ROS     Malignancy:  - neg malignancy ROS     Other:            Physical Exam    Airway  airway exam normal      Mallampati: II   TM distance: > 3 FB   Neck ROM: full   Mouth opening: > 3 cm    Respiratory Devices and Support         Dental  no notable dental history         Cardiovascular          Rhythm and rate: regular     Pulmonary                   OUTSIDE LABS:  CBC: No results found for: WBC, HGB, HCT, PLT  BMP:   Lab Results   Component Value Date    CR 1.01 06/28/2016     (H) 12/18/2007     COAGS: No results found for: PTT, INR, FIBR  POC:   Lab Results   Component Value Date     (H) 12/18/2007     HEPATIC: No results found for: ALBUMIN, PROTTOTAL, ALT, AST, GGT, ALKPHOS, BILITOTAL, BILIDIRECT, LATANYA  OTHER: No results found for: PH, LACT, A1C, JOSE, PHOS, MAG, LIPASE, AMYLASE, TSH, T4, T3, CRP, SED    Anesthesia Plan    ASA Status:  3      Anesthesia Type: MAC.     - Reason for MAC: straight local not clinically adequate              Consents    Anesthesia Plan(s) and associated risks, benefits, and realistic alternatives discussed. Questions answered and patient/representative(s) expressed understanding.    - Discussed:     - Discussed with:  Patient         Postoperative Care    Pain management: IV analgesics.        Comments:                Froilan Arita MD

## 2022-04-14 ENCOUNTER — ANESTHESIA (OUTPATIENT)
Dept: SURGERY | Facility: CLINIC | Age: 77
End: 2022-04-14
Payer: MEDICARE

## 2022-04-14 ENCOUNTER — HOSPITAL ENCOUNTER (OUTPATIENT)
Facility: CLINIC | Age: 77
Discharge: HOME | End: 2022-04-14
Attending: OPHTHALMOLOGY | Admitting: OPHTHALMOLOGY
Payer: MEDICARE

## 2022-04-14 VITALS
HEART RATE: 48 BPM | TEMPERATURE: 97.7 F | BODY MASS INDEX: 26.59 KG/M2 | RESPIRATION RATE: 16 BRPM | OXYGEN SATURATION: 98 % | WEIGHT: 169.4 LBS | DIASTOLIC BLOOD PRESSURE: 82 MMHG | HEIGHT: 67 IN | SYSTOLIC BLOOD PRESSURE: 161 MMHG

## 2022-04-14 DIAGNOSIS — H02.9 EYELID LESION: Primary | ICD-10-CM

## 2022-04-14 LAB — POTASSIUM BLD-SCNC: 4.6 MMOL/L (ref 3.4–5.3)

## 2022-04-14 PROCEDURE — 272N000001 HC OR GENERAL SUPPLY STERILE: Performed by: OPHTHALMOLOGY

## 2022-04-14 PROCEDURE — 258N000003 HC RX IP 258 OP 636: Performed by: ANESTHESIOLOGY

## 2022-04-14 PROCEDURE — 250N000011 HC RX IP 250 OP 636: Performed by: NURSE ANESTHETIST, CERTIFIED REGISTERED

## 2022-04-14 PROCEDURE — 370N000017 HC ANESTHESIA TECHNICAL FEE, PER MIN: Performed by: OPHTHALMOLOGY

## 2022-04-14 PROCEDURE — 84132 ASSAY OF SERUM POTASSIUM: CPT | Performed by: ANESTHESIOLOGY

## 2022-04-14 PROCEDURE — 36415 COLL VENOUS BLD VENIPUNCTURE: CPT | Performed by: ANESTHESIOLOGY

## 2022-04-14 PROCEDURE — 250N000009 HC RX 250: Performed by: NURSE ANESTHETIST, CERTIFIED REGISTERED

## 2022-04-14 PROCEDURE — 250N000013 HC RX MED GY IP 250 OP 250 PS 637: Performed by: ANESTHESIOLOGY

## 2022-04-14 PROCEDURE — 250N000009 HC RX 250: Performed by: OPHTHALMOLOGY

## 2022-04-14 PROCEDURE — 710N000009 HC RECOVERY PHASE 1, LEVEL 1, PER MIN: Performed by: OPHTHALMOLOGY

## 2022-04-14 PROCEDURE — 88305 TISSUE EXAM BY PATHOLOGIST: CPT | Mod: TC | Performed by: OPHTHALMOLOGY

## 2022-04-14 PROCEDURE — 710N000012 HC RECOVERY PHASE 2, PER MINUTE: Performed by: OPHTHALMOLOGY

## 2022-04-14 PROCEDURE — 360N000076 HC SURGERY LEVEL 3, PER MIN: Performed by: OPHTHALMOLOGY

## 2022-04-14 PROCEDURE — 999N000141 HC STATISTIC PRE-PROCEDURE NURSING ASSESSMENT: Performed by: OPHTHALMOLOGY

## 2022-04-14 RX ORDER — PROPOFOL 10 MG/ML
INJECTION, EMULSION INTRAVENOUS PRN
Status: DISCONTINUED | OUTPATIENT
Start: 2022-04-14 | End: 2022-04-14

## 2022-04-14 RX ORDER — HYDROMORPHONE HCL IN WATER/PF 6 MG/30 ML
0.2 PATIENT CONTROLLED ANALGESIA SYRINGE INTRAVENOUS EVERY 5 MIN PRN
Status: DISCONTINUED | OUTPATIENT
Start: 2022-04-14 | End: 2022-04-14 | Stop reason: HOSPADM

## 2022-04-14 RX ORDER — LIDOCAINE HYDROCHLORIDE 20 MG/ML
INJECTION, SOLUTION INFILTRATION; PERINEURAL PRN
Status: DISCONTINUED | OUTPATIENT
Start: 2022-04-14 | End: 2022-04-14

## 2022-04-14 RX ORDER — ONDANSETRON 2 MG/ML
INJECTION INTRAMUSCULAR; INTRAVENOUS PRN
Status: DISCONTINUED | OUTPATIENT
Start: 2022-04-14 | End: 2022-04-14

## 2022-04-14 RX ORDER — SODIUM CHLORIDE, SODIUM LACTATE, POTASSIUM CHLORIDE, CALCIUM CHLORIDE 600; 310; 30; 20 MG/100ML; MG/100ML; MG/100ML; MG/100ML
INJECTION, SOLUTION INTRAVENOUS CONTINUOUS
Status: DISCONTINUED | OUTPATIENT
Start: 2022-04-14 | End: 2022-04-14 | Stop reason: HOSPADM

## 2022-04-14 RX ORDER — OXYCODONE HYDROCHLORIDE 5 MG/1
5 TABLET ORAL ONCE
Status: COMPLETED | OUTPATIENT
Start: 2022-04-14 | End: 2022-04-14

## 2022-04-14 RX ORDER — FENTANYL CITRATE 50 UG/ML
INJECTION, SOLUTION INTRAMUSCULAR; INTRAVENOUS PRN
Status: DISCONTINUED | OUTPATIENT
Start: 2022-04-14 | End: 2022-04-14

## 2022-04-14 RX ORDER — ONDANSETRON 2 MG/ML
4 INJECTION INTRAMUSCULAR; INTRAVENOUS EVERY 30 MIN PRN
Status: DISCONTINUED | OUTPATIENT
Start: 2022-04-14 | End: 2022-04-14 | Stop reason: HOSPADM

## 2022-04-14 RX ORDER — TETRACAINE HYDROCHLORIDE 5 MG/ML
SOLUTION OPHTHALMIC PRN
Status: DISCONTINUED | OUTPATIENT
Start: 2022-04-14 | End: 2022-04-14 | Stop reason: HOSPADM

## 2022-04-14 RX ORDER — LIDOCAINE 40 MG/G
CREAM TOPICAL
Status: DISCONTINUED | OUTPATIENT
Start: 2022-04-14 | End: 2022-04-14 | Stop reason: HOSPADM

## 2022-04-14 RX ORDER — OXYCODONE HYDROCHLORIDE 5 MG/1
5 TABLET ORAL EVERY 6 HOURS PRN
Qty: 12 TABLET | Refills: 0 | Status: SHIPPED | OUTPATIENT
Start: 2022-04-14 | End: 2022-04-17

## 2022-04-14 RX ORDER — ONDANSETRON 4 MG/1
4 TABLET, ORALLY DISINTEGRATING ORAL EVERY 30 MIN PRN
Status: DISCONTINUED | OUTPATIENT
Start: 2022-04-14 | End: 2022-04-14 | Stop reason: HOSPADM

## 2022-04-14 RX ORDER — ERYTHROMYCIN 5 MG/G
OINTMENT OPHTHALMIC
Qty: 3.5 G | Refills: 0 | Status: SHIPPED | OUTPATIENT
Start: 2022-04-14 | End: 2024-06-24

## 2022-04-14 RX ORDER — FENTANYL CITRATE 0.05 MG/ML
25 INJECTION, SOLUTION INTRAMUSCULAR; INTRAVENOUS EVERY 5 MIN PRN
Status: DISCONTINUED | OUTPATIENT
Start: 2022-04-14 | End: 2022-04-14 | Stop reason: HOSPADM

## 2022-04-14 RX ORDER — ERYTHROMYCIN 5 MG/G
OINTMENT OPHTHALMIC PRN
Status: DISCONTINUED | OUTPATIENT
Start: 2022-04-14 | End: 2022-04-14 | Stop reason: HOSPADM

## 2022-04-14 RX ORDER — FENTANYL CITRATE 0.05 MG/ML
25 INJECTION, SOLUTION INTRAMUSCULAR; INTRAVENOUS
Status: DISCONTINUED | OUTPATIENT
Start: 2022-04-14 | End: 2022-04-14 | Stop reason: HOSPADM

## 2022-04-14 RX ADMIN — FENTANYL CITRATE 25 MCG: 50 INJECTION, SOLUTION INTRAMUSCULAR; INTRAVENOUS at 11:29

## 2022-04-14 RX ADMIN — PROPOFOL 10 MG: 10 INJECTION, EMULSION INTRAVENOUS at 11:47

## 2022-04-14 RX ADMIN — SODIUM CHLORIDE, POTASSIUM CHLORIDE, SODIUM LACTATE AND CALCIUM CHLORIDE: 600; 310; 30; 20 INJECTION, SOLUTION INTRAVENOUS at 11:28

## 2022-04-14 RX ADMIN — ONDANSETRON 4 MG: 2 INJECTION INTRAMUSCULAR; INTRAVENOUS at 11:35

## 2022-04-14 RX ADMIN — OXYCODONE HYDROCHLORIDE 5 MG: 5 TABLET ORAL at 13:33

## 2022-04-14 RX ADMIN — PROPOFOL 40 MG: 10 INJECTION, EMULSION INTRAVENOUS at 11:32

## 2022-04-14 RX ADMIN — PROPOFOL 10 MG: 10 INJECTION, EMULSION INTRAVENOUS at 11:50

## 2022-04-14 RX ADMIN — LIDOCAINE HYDROCHLORIDE 40 MG: 20 INJECTION, SOLUTION INFILTRATION; PERINEURAL at 11:47

## 2022-04-14 NOTE — ANESTHESIA POSTPROCEDURE EVALUATION
Patient: Kingston Antoine    Procedure: Procedure(s):  LEFT LOWER LID WEDGE WITH FROZEN SECTION CONTROL       Anesthesia Type:  MAC    Note:  Disposition: Outpatient   Postop Pain Control: Uneventful            Sign Out: Well controlled pain   PONV: No   Neuro/Psych: Uneventful            Sign Out: Acceptable/Baseline neuro status   Airway/Respiratory: Uneventful            Sign Out: Acceptable/Baseline resp. status   CV/Hemodynamics: Uneventful            Sign Out: Acceptable CV status   Other NRE: NONE   DID A NON-ROUTINE EVENT OCCUR? No           Last vitals:  Vitals Value Taken Time   /82 04/14/22 1330   Temp 36.5  C (97.7  F) 04/14/22 1245   Pulse 47 04/14/22 1342   Resp 18 04/14/22 1343   SpO2 98 % 04/14/22 1330   Vitals shown include unvalidated device data.    Electronically Signed By: Froilan Arita MD  April 14, 2022  2:37 PM

## 2022-04-14 NOTE — DISCHARGE INSTRUCTIONS
Same Day Surgery Discharge Instructions for  Sedation and General Anesthesia     It's not unusual to feel dizzy, light-headed or faint for up to 24 hours after surgery or while taking pain medication.  If you have these symptoms: sit for a few minutes before standing and have someone assist you when you get up to walk or use the bathroom.    You should rest and relax for the next 24 hours. We recommend you make arrangements to have an adult stay with you for at least 24 hours after your discharge.  Avoid hazardous and strenuous activity.    DO NOT DRIVE any vehicle or operate mechanical equipment for 24 hours following the end of your surgery.  Even though you may feel normal, your reactions may be affected by the medication you have received.    Do not drink alcoholic beverages for 24 hours following surgery.     Slowly progress to your regular diet as you feel able. It's not unusual to feel nauseated and/or vomit after receiving anesthesia.  If you develop these symptoms, drink clear liquids (apple juice, ginger ale, broth, 7-up, etc. ) until you feel better.  If your nausea and vomiting persists for 24 hours, please notify your surgeon.      All narcotic pain medications, along with inactivity and anesthesia, can cause constipation. Drinking plenty of liquids and increasing fiber intake will help.    For any questions of a medical nature, call your surgeon.    Do not make important decisions for 24 hours.    If you had general anesthesia, you may have a sore throat for a couple of days related to the breathing tube used during surgery.  You may use Cepacol lozenges to help with this discomfort.  If it worsens or if you develop a fever, contact your surgeon.     If you feel your pain is not well managed with the pain medications prescribed by your surgeon, please contact your surgeon's office to let them know so they can address your concerns.       CoVid 19 Information    We want to give you information regarding  Covid. Please consult your primary care provider with any questions you might have.     Patient who have symptoms (cough, fever, or shortness of breath), need to isolate for 7 days from when symptoms started OR 72 hours after fever resolves (without fever reducing medications) AND improvement of respiratory symptoms (whichever is longer).    Isolate yourself at home (in own room/own bathroom if possible)  Do Not allow any visitors  Do Not go to work or school  Do Not go to [employer],  centers, shopping, or other public places.  Do Not shake hands.  Avoid close and intimate contact with others (hugging, kissing).  Follow CDC recommendations for household cleaning of frequently touched services.     After the initial 7 days, continue to isolate yourself from household members as much as possible. To continue decrease the risk of community spread and exposure, you and any members of your household should limit activities in public for 14 days after starting home isolation.     You can reference the following CDC link for helpful home isolation/care tips:  https://www.cdc.gov/coronavirus/2019-ncov/downloads/10Things.pdf    Protect Others:  Cover Your Mouth and Nose with a mask, disposable tissue or wash cloth to avoid spreading germs to others.  Wash your hands and face frequently with soap and water    Call Your Primary Doctor If: Breathing difficulty develops or you become worse.    For more information about COVID19 and options for caring for yourself at home, please visit the CDC website at https://www.cdc.gov/coronavirus/2019-ncov/about/steps-when-sick.html  For more options for care at [facility], please visit our website at [email or web address]         Post-operative Instructions    Ophthalmic Plastic and Reconstructive Surgery  Delroy Catherine M.D.      All instructions apply to the operated eye(s) or eyelid(s)      What to expect after surgery:  Thre will be some  swelling, bruising, and likely a black eye (even into the lower eyelids and cheeks). Also expect crusting and discharge from the eye and/or incisions.   A small amount of surface bleeding is normal for the first 48 hours after surgery.  You may notice some bloody tears for the first few days after surgery. This is normal.  Your eye(s) and eyelid(s) may be painful and tender. This is normal after surgery. Use the pain medication as prescribed. If your pain does not improve despite the medication, contact the office.    Wound care and personal care:  If a patch or bandage has been placed, please leave this in place until seen in clinic. Prevent the bandage from getting wet.   Apply ice compresses 15 minutes on 15 minutes off while awake for the first 2 days after surgery, then switch to warm compresses 4 times a day until seen by your physician.   For warm packs you can place a cup of dry uncooked rice in a clean cotton sock. Place sock in microwave 30 seconds to one minute. Next place the warm sock into a plastic bag and wrap the bag with clean warm wet washcloth and place over operated eye.    You may shower or wash your hair the day after surgery. Do not bathe or go swimming for 1 week to prevent contamination of your wounds.  Do not apply make-up to the eyes or eyelids for 2 weeks after surgery.      Activity restrictions and driving:  Avoid heavy lifting, bending, exercise or strenuous activity for 1 week after surgery.  You may resume other activities and return to work as tolerated.  You may not resume driving until have you stopped using narcotic pain medications(such as Norco, Percocet, Tylenol #3).    Medications:  Restart all your regular home medications and eye drops today. If you take Plavix or Aspirin on a regular basis, wait for 3 days after your surgery before restarting these in order to decrease the risk of bleeding complications.  Avoid aspirin and aspirin-like medications (Motrin, Aleve,  Ibuprofen, Cherie-[place] etc) for 5 days to reduce the risk of bleeding. You may take Tylenol (acetaminophen) for pain.  In addition to your home medications, take the following post-operative medications as prescribed by your physician:  Apply antibiotic ointment (erythromycin) to all sutures three times a day, and into the operated eye(s) at night.  Take 1 to 2 pain pills (norco or tylenol 3 as prescribed) as needed for pain up to every 4 hours.  The pain pills may make you drowsy. You must not drive a car, operate heavy machinery or drink alcohol while taking them.  The pain pills may cause constipation and nausea. Take them with some food to prevent a stomach upset. If you continue to experience nausea, call your physician.    WARNING: All the prescription pain medications listed above contain Tylenol (acetaminophen). You must not take more than 4,000 mg of acetaminophen per 24-hour period. This is equivalent to 6 tablets of Darvocet, 8 tablets of Vicodin, or 12 tablets of Norco, Percocet or Tylenol #3. If you take other over-the-counter medications containing acetaminophen, you must take the amount of acetaminophen into account and reduce the number of prescribed pain pills accordingly.    Contact information and follow-up:  Return to the Eye Clinic for a follow-up appointment with your physician as  scheduled. If no appointment has been scheduled, call [phone] for an  appointment with Dr. Catherine within 1 to 2 weeks from your date of surgery.  For severe pain, bleeding, or loss of vision, call the Eye Clinic at [phone].    An on call person can be reached after hours for concerns. The on call doctor should not call in medication refill requests after hours or on weekends, so please plan accordingly. An effort has been made to provide adequate pain medications following every surgery, and refills will not be provided in most instances. Narcotic pain medications cannot be called in.

## 2022-04-14 NOTE — BRIEF OP NOTE
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]  Post-operative diagnosis Same as pre-operative diagnosis    Procedure: Procedure(s):  LEFT LOWER LID WEDGE WITH FROZEN SECTION CONTROL  Surgeon: Surgeon(s) and Role:     * Delroy Catherine MD - Primary  Anesthesia: Monitor Anesthesia Care   Estimated Blood Loss: Minimal    Drains: none   Specimens:   ID Type Source Tests Collected by Time Destination   1 : Left Lower Eyelid Lesion Tissue Eyelid, Lower, Left SURGICAL PATHOLOGY EXAM Delroy Catherine MD 4/14/2022 11:42 AM    2 : Left Lower Eyelid Medial Margin Tissue Eyelid, Lower, Left SURGICAL PATHOLOGY EXAM Delroy Catherine MD 4/14/2022 11:42 AM    3 : Left Lower Eyelid Lateral Margin Tissue Eyelid, Lower, Left SURGICAL PATHOLOGY EXAM Delroy Catherine MD 4/14/2022 11:42 AM    4 : Left Lower Eyelid Inferior Margin Tissue Eyelid, Lower, Left SURGICAL PATHOLOGY EXAM Delroy Catherine MD 4/14/2022 11:42 AM      Findings:   as expected.  Complications: None.  Implants: * No implants in log *

## 2022-04-14 NOTE — OP NOTE
PREOPERATIVE DIAGNOSIS: Left lower eyelid neoplasm.   POSTOPERATIVE DIAGNOSIS: Left lower eyelid neoplasm.   PROCEDURE: Left lower eyelid wedge resection with frozen section guidance and reconstruction with lateral Tenzel flap and lateral canthopexy.   ANESTHESIA: Monitored with local infiltration of  2% lidocaine with epinephrine.  SURGEON: Delroy Catherine MD  ASSISTANT: Lexie Davis MD  COMPLICATIONS: None.   ESTIMATED BLOOD LOSS: Less than 5 mL.   SPECIMENS:   1. Left lower eyelid lesion in formalin   2. Left lower eyelid, medial, inferior and lateral margins for frozen section analysis.   HISTORY AND INDICATIONS: The patient presented with a left lower lid lesion that was slowly enlarging. Clinically, this appeared to be consistent with a basal cell or squamous cell.  After the risks, benefits and alternatives to the proposed procedure were explained, informed consent was obtained.   DESCRIPTION OF PROCEDURE: The patient was brought to the operating room and placed supine on the operating table. IV sedation was given. The  left lower lid and  lateral canthal areas were infiltrated with local anesthetic. He was prepped and draped in the typical sterile ophthalmic fashion. Attention was directed to the left lower lid. The area of the lesion was excised with a Alison scissors. Hemostasis was obtained with high temperature cautery. Medial, lateral and inferior margins were sent for frozen section analysis. These were all negative. A lateral canthal flap was drawn and incised with a 15 blade and undermined with Alison scissors and high temperature cautery. The flap was rotated in the defect and sutured in place with a vertical mattress 6-0 Vicryl suture tied internally at the lid margin and the 6-0 Vicryl suture at the lash line. Interrupted 5-0 Vicryl sutures were used to close the posterior lamella. Skin was closed with interrupted 6-0 Vicryl and 6-0 plain gut sutures. Lateral canthopexy suture of 5-0  Vicryl was used to secure the flap to lateral orbital rim periosteum and a 6-0 Vicryl suture used to secure the canthal angle to the upper eyelid. The skin was closed with running 6-0 plain gut suture. Erythromycin ophthalmic ointment was applied. The patient tolerated the procedure well and left the operating room in stable condition.   SANTA JENKINS MD

## 2022-04-14 NOTE — ANESTHESIA CARE TRANSFER NOTE
Patient: Kingston Antoine    Procedure: Procedure(s):  LEFT LOWER LID WEDGE WITH FROZEN SECTION CONTROL       Diagnosis: Neoplasm of uncertain behavior of skin [D48.5]  Diagnosis Additional Information: No value filed.    Anesthesia Type:   MAC     Note:    Oropharynx: spontaneously breathing  Level of Consciousness: awake  Oxygen Supplementation: room air    Independent Airway: airway patency satisfactory and stable  Dentition: dentition unchanged      Patient transferred to: PACU    Handoff Report: Identifed the Patient, Identified the Reponsible Provider, Reviewed the pertinent medical history, Discussed the surgical course, Reviewed Intra-OP anesthesia mangement and issues during anesthesia, Set expectations for post-procedure period and Allowed opportunity for questions and acknowledgement of understanding      Vitals:  Vitals Value Taken Time   BP     Temp     Pulse 51 04/14/22 1246   Resp 17 04/14/22 1246   SpO2 97 % 04/14/22 1246   Vitals shown include unvalidated device data.    Electronically Signed By: ANEUDY Russo CRNA  April 14, 2022  12:47 PM

## 2022-04-15 LAB
PATH REPORT.COMMENTS IMP SPEC: ABNORMAL
PATH REPORT.COMMENTS IMP SPEC: ABNORMAL
PATH REPORT.COMMENTS IMP SPEC: YES
PATH REPORT.FINAL DX SPEC: ABNORMAL
PATH REPORT.GROSS SPEC: ABNORMAL
PATH REPORT.INTRAOP OBS SPEC DOC: ABNORMAL
PATH REPORT.MICROSCOPIC SPEC OTHER STN: ABNORMAL
PATH REPORT.RELEVANT HX SPEC: ABNORMAL
PHOTO IMAGE: ABNORMAL

## 2022-04-15 PROCEDURE — 88305 TISSUE EXAM BY PATHOLOGIST: CPT | Mod: 26 | Performed by: PATHOLOGY

## 2022-04-15 PROCEDURE — 88331 PATH CONSLTJ SURG 1 BLK 1SPC: CPT | Mod: 26 | Performed by: PATHOLOGY

## 2022-04-15 PROCEDURE — 88332 PATH CONSLTJ SURG EA ADD BLK: CPT | Mod: 26 | Performed by: PATHOLOGY

## 2022-04-22 PROCEDURE — 88307 TISSUE EXAM BY PATHOLOGIST: CPT | Mod: TC,ORL | Performed by: UROLOGY

## 2022-04-25 ENCOUNTER — LAB REQUISITION (OUTPATIENT)
Dept: LAB | Facility: CLINIC | Age: 77
End: 2022-04-25
Payer: MEDICARE

## 2022-04-27 LAB
PATH REPORT.COMMENTS IMP SPEC: NORMAL
PATH REPORT.COMMENTS IMP SPEC: NORMAL
PATH REPORT.FINAL DX SPEC: NORMAL
PATH REPORT.GROSS SPEC: NORMAL
PATH REPORT.MICROSCOPIC SPEC OTHER STN: NORMAL
PATH REPORT.RELEVANT HX SPEC: NORMAL
PHOTO IMAGE: NORMAL

## 2022-04-27 PROCEDURE — 88307 TISSUE EXAM BY PATHOLOGIST: CPT | Mod: 26 | Performed by: PATHOLOGY

## 2022-08-06 ENCOUNTER — HEALTH MAINTENANCE LETTER (OUTPATIENT)
Age: 77
End: 2022-08-06

## 2022-10-22 ENCOUNTER — HEALTH MAINTENANCE LETTER (OUTPATIENT)
Age: 77
End: 2022-10-22

## 2022-12-04 ENCOUNTER — HEALTH MAINTENANCE LETTER (OUTPATIENT)
Age: 77
End: 2022-12-04

## 2023-04-01 ENCOUNTER — HEALTH MAINTENANCE LETTER (OUTPATIENT)
Age: 78
End: 2023-04-01

## 2023-08-27 ENCOUNTER — HEALTH MAINTENANCE LETTER (OUTPATIENT)
Age: 78
End: 2023-08-27

## 2023-11-17 ENCOUNTER — HOSPITAL ENCOUNTER (EMERGENCY)
Facility: CLINIC | Age: 78
Discharge: HOME | End: 2023-11-17
Attending: EMERGENCY MEDICINE | Admitting: EMERGENCY MEDICINE
Payer: MEDICARE

## 2023-11-17 VITALS
WEIGHT: 169 LBS | TEMPERATURE: 97 F | OXYGEN SATURATION: 96 % | RESPIRATION RATE: 20 BRPM | HEART RATE: 65 BPM | DIASTOLIC BLOOD PRESSURE: 60 MMHG | BODY MASS INDEX: 25.61 KG/M2 | HEIGHT: 68 IN | SYSTOLIC BLOOD PRESSURE: 91 MMHG

## 2023-11-17 DIAGNOSIS — I48.0 PAROXYSMAL ATRIAL FIBRILLATION (H): ICD-10-CM

## 2023-11-17 DIAGNOSIS — R42 LIGHTHEADEDNESS: ICD-10-CM

## 2023-11-17 DIAGNOSIS — I95.1 ORTHOSTATIC HYPOTENSION: Primary | ICD-10-CM

## 2023-11-17 LAB
ANION GAP SERPL CALCULATED.3IONS-SCNC: 5 MMOL/L (ref 7–15)
ATRIAL RATE - MUSE: 60 BPM
BASOPHILS # BLD AUTO: 0 10E3/UL (ref 0–0.2)
BASOPHILS NFR BLD AUTO: 0 %
BUN SERPL-MCNC: 33.8 MG/DL (ref 8–23)
CALCIUM SERPL-MCNC: 10.1 MG/DL (ref 8.8–10.2)
CHLORIDE SERPL-SCNC: 105 MMOL/L (ref 98–107)
CREAT SERPL-MCNC: 0.96 MG/DL (ref 0.67–1.17)
DEPRECATED HCO3 PLAS-SCNC: 31 MMOL/L (ref 22–29)
DIASTOLIC BLOOD PRESSURE - MUSE: NORMAL MMHG
EGFRCR SERPLBLD CKD-EPI 2021: 80 ML/MIN/1.73M2
EOSINOPHIL # BLD AUTO: 0.1 10E3/UL (ref 0–0.7)
EOSINOPHIL NFR BLD AUTO: 1 %
ERYTHROCYTE [DISTWIDTH] IN BLOOD BY AUTOMATED COUNT: 12.3 % (ref 10–15)
GLUCOSE SERPL-MCNC: 175 MG/DL (ref 70–99)
HCT VFR BLD AUTO: 50.2 % (ref 40–53)
HGB BLD-MCNC: 16.2 G/DL (ref 13.3–17.7)
HOLD SPECIMEN: NORMAL
HOLD SPECIMEN: NORMAL
IMM GRANULOCYTES # BLD: 0 10E3/UL
IMM GRANULOCYTES NFR BLD: 0 %
INTERPRETATION ECG - MUSE: NORMAL
LYMPHOCYTES # BLD AUTO: 1.8 10E3/UL (ref 0.8–5.3)
LYMPHOCYTES NFR BLD AUTO: 27 %
MAGNESIUM SERPL-MCNC: 2.3 MG/DL (ref 1.7–2.3)
MCH RBC QN AUTO: 29.7 PG (ref 26.5–33)
MCHC RBC AUTO-ENTMCNC: 32.3 G/DL (ref 31.5–36.5)
MCV RBC AUTO: 92 FL (ref 78–100)
MONOCYTES # BLD AUTO: 0.4 10E3/UL (ref 0–1.3)
MONOCYTES NFR BLD AUTO: 6 %
NEUTROPHILS # BLD AUTO: 4.5 10E3/UL (ref 1.6–8.3)
NEUTROPHILS NFR BLD AUTO: 66 %
NRBC # BLD AUTO: 0 10E3/UL
NRBC BLD AUTO-RTO: 0 /100
P AXIS - MUSE: 52 DEGREES
PLATELET # BLD AUTO: 138 10E3/UL (ref 150–450)
POTASSIUM SERPL-SCNC: 4.7 MMOL/L (ref 3.4–5.3)
PR INTERVAL - MUSE: 206 MS
QRS DURATION - MUSE: 102 MS
QT - MUSE: 400 MS
QTC - MUSE: 400 MS
R AXIS - MUSE: -8 DEGREES
RBC # BLD AUTO: 5.45 10E6/UL (ref 4.4–5.9)
SODIUM SERPL-SCNC: 141 MMOL/L (ref 135–145)
SYSTOLIC BLOOD PRESSURE - MUSE: NORMAL MMHG
T AXIS - MUSE: 46 DEGREES
TROPONIN T SERPL HS-MCNC: 17 NG/L
TROPONIN T SERPL HS-MCNC: 18 NG/L
TSH SERPL DL<=0.005 MIU/L-ACNC: 0.91 UIU/ML (ref 0.3–4.2)
VENTRICULAR RATE- MUSE: 60 BPM
WBC # BLD AUTO: 6.8 10E3/UL (ref 4–11)

## 2023-11-17 PROCEDURE — 93005 ELECTROCARDIOGRAM TRACING: CPT

## 2023-11-17 PROCEDURE — 84484 ASSAY OF TROPONIN QUANT: CPT | Performed by: EMERGENCY MEDICINE

## 2023-11-17 PROCEDURE — 84443 ASSAY THYROID STIM HORMONE: CPT | Performed by: EMERGENCY MEDICINE

## 2023-11-17 PROCEDURE — 258N000003 HC RX IP 258 OP 636: Performed by: EMERGENCY MEDICINE

## 2023-11-17 PROCEDURE — 96360 HYDRATION IV INFUSION INIT: CPT

## 2023-11-17 PROCEDURE — 99284 EMERGENCY DEPT VISIT MOD MDM: CPT | Mod: 25

## 2023-11-17 PROCEDURE — 82374 ASSAY BLOOD CARBON DIOXIDE: CPT | Performed by: EMERGENCY MEDICINE

## 2023-11-17 PROCEDURE — 83735 ASSAY OF MAGNESIUM: CPT | Performed by: EMERGENCY MEDICINE

## 2023-11-17 PROCEDURE — 85004 AUTOMATED DIFF WBC COUNT: CPT | Performed by: EMERGENCY MEDICINE

## 2023-11-17 PROCEDURE — 36415 COLL VENOUS BLD VENIPUNCTURE: CPT | Performed by: EMERGENCY MEDICINE

## 2023-11-17 PROCEDURE — 96361 HYDRATE IV INFUSION ADD-ON: CPT

## 2023-11-17 RX ADMIN — SODIUM CHLORIDE 1000 ML: 9 INJECTION, SOLUTION INTRAVENOUS at 13:52

## 2023-11-17 RX ADMIN — SODIUM CHLORIDE 1000 ML: 9 INJECTION, SOLUTION INTRAVENOUS at 11:39

## 2023-11-17 ASSESSMENT — ACTIVITIES OF DAILY LIVING (ADL)
ADLS_ACUITY_SCORE: 35
ADLS_ACUITY_SCORE: 35

## 2023-11-17 NOTE — ED NOTES
Patient continues to be orthostatic with blood pressure dropping to 70s while standing. However, did not feel dizzy this time. 2nd litter NS bolus started per MD recommendation.

## 2023-11-17 NOTE — ED TRIAGE NOTES
Dizziness - not feeling right - when pt go up felt dizzy - had some heartburn - checked blood pressure 85/65 - home monitor said pt could be in atrial fib.   Denies SOB      Triage Assessment (Adult)       Row Name 11/17/23 0951          Triage Assessment    Airway WDL WDL        Respiratory WDL    Respiratory WDL WDL        Cardiac WDL    Cardiac WDL WDL        Cognitive/Neuro/Behavioral WDL    Cognitive/Neuro/Behavioral WDL WDL

## 2023-11-17 NOTE — ED PROVIDER NOTES
History   Chief Complaint:  Dizziness     HPI   Kingston Antoine is a 77 year old male with a history of Chronic kidney disease, Type 2 diabetes, HTN, Migraine, Parkinson disease  who presents to the ED for dizziness. Patient reports feeling dizzy as he was getting up from bed earlier this morning. Wife adds he was having a heart burn as well with a rate of 130. Kingston does not recall passing out, nor heaving chest pain and light head. Aside from it, he is expeiencing a small abdominal pain on his left upper quadrant that started lat night. Took his morning medications and A-fib like normal. Is on blood thinner because he had a small PFO on heart. Denies having cholesterol history. No urinary symptoms or recent fevers.      Independent Historian:   None - Patient Only    Review of External Notes:   I reviewed the Urology note from 9/12/23. History of bladder cancer followed by Dr. Han.     Medications:    Erythromycin   Apixaban   Carbidopa-levodopa   Pantoprazole   Sildenafil   Sotalol   Triamcinolone   Triamterene   Zinc     Past Medical History:    Arrythmia   Chronic kidney disease   Type 2 diabetes   HTN   Migraine   Parkinson disease     Past Surgical History:    Carmen fistula repair cholecystectomy   Colonoscopy   Hernia repair   Tonsillectomy and adenoidectomy   Excision lesion eyelid      Physical Exam   Patient Vitals for the past 24 hrs:   BP Temp Temp src Pulse Resp SpO2 Height Weight   11/17/23 1531 91/60 -- -- 65 -- 96 % -- --   11/17/23 1528 101/58 -- -- 58 -- 97 % -- --   11/17/23 1506 -- -- -- 56 -- 98 % -- --   11/17/23 1446 -- -- -- 53 -- 98 % -- --   11/17/23 1445 -- -- -- 53 -- 98 % -- --   11/17/23 1431 (!) 89/48 -- -- 51 -- -- -- --   11/17/23 1419 102/52 -- -- (!) 49 -- -- -- --   11/17/23 1412 -- -- -- 55 -- -- -- --   11/17/23 1357 114/50 -- -- 53 -- 99 % -- --   11/17/23 1342 111/58 -- -- (!) 49 20 99 % -- --   11/17/23 1966 (!) 73/50 -- -- 62 18 98 % -- --   11/17/23 8563 -- -- -- 51  "24 95 % -- --   11/17/23 1332 91/54 -- -- 52 25 -- -- --   11/17/23 0943 (!) 140/69 97  F (36.1  C) Temporal 57 16 99 % 1.727 m (5' 8\") 76.7 kg (169 lb)      Physical Exam  General: Alert, appears well-developed and well-nourished. Cooperative.     In mild distress  HEENT:  Head:  Atraumatic  Ears:  External ears are normal  Mouth/Throat:  Oropharynx is without erythema or exudate and mucous membranes are moist.   Eyes:   Conjunctivae normal and EOM are normal. No scleral icterus.  CV:  Normal rate, regular rhythm, normal heart sounds and radial pulses are 2+ and symmetric.  No murmur.  Resp:  Breath sounds are clear bilaterally    Non-labored, no retractions or accessory muscle use  GI:  Abdomen is soft, no distension, no tenderness. No rebound or guarding.  No CVA tenderness bilaterally  MS:  Normal range of motion. No edema.    Normal strength in all 4 extremities.     Back atraumatic.    No midline cervical, thoracic, or lumbar tenderness  Skin:  Warm and dry.  No rash or lesions noted.  Neuro:   Alert. Normal strength.  GCS: 15  Psych: Normal mood and affect.    Emergency Department Course   ECG  ECG results from 11/17/23   EKG 12-lead, tracing only     Value    Systolic Blood Pressure     Diastolic Blood Pressure     Ventricular Rate 60    Atrial Rate 60    GA Interval 206    QRS Duration 102        QTc 400    P Axis 52    R AXIS -8    T Axis 46    Interpretation ECG      Sinus rhythm with sinus arrhythmia  Low voltage QRS  Incomplete right bundle branch block  Borderline ECG  No previous ECGs available  Confirmed by GENERATED REPORT, COMPUTER ([id number]),  LUIS ARMANDO MELCHOR ([id number]) on 11/17/2023 10:02:13 AM       Imaging:  No orders to display      Laboratory:  Labs Ordered and Resulted from Time of ED Arrival to Time of ED Departure   BASIC METABOLIC PANEL - Abnormal       Result Value    Sodium 141      Potassium 4.7      Chloride 105      Carbon Dioxide (CO2) 31 (*)     Anion Gap 5 (*)     Urea " Nitrogen 33.8 (*)     Creatinine 0.96      GFR Estimate 80      Calcium 10.1      Glucose 175 (*)    CBC WITH PLATELETS AND DIFFERENTIAL - Abnormal    WBC Count 6.8      RBC Count 5.45      Hemoglobin 16.2      Hematocrit 50.2      MCV 92      MCH 29.7      MCHC 32.3      RDW 12.3      Platelet Count 138 (*)     % Neutrophils 66      % Lymphocytes 27      % Monocytes 6      % Eosinophils 1      % Basophils 0      % Immature Granulocytes 0      NRBCs per 100 WBC 0      Absolute Neutrophils 4.5      Absolute Lymphocytes 1.8      Absolute Monocytes 0.4      Absolute Eosinophils 0.1      Absolute Basophils 0.0      Absolute Immature Granulocytes 0.0      Absolute NRBCs 0.0     TROPONIN T, HIGH SENSITIVITY - Normal    Troponin T, High Sensitivity 18     TSH WITH FREE T4 REFLEX - Normal    TSH 0.91     MAGNESIUM - Normal    Magnesium 2.3     TROPONIN T, HIGH SENSITIVITY - Normal    Troponin T, High Sensitivity 17          Procedures     Emergency Department Course & Assessments:  Interventions:  Medications   sodium chloride 0.9% BOLUS 1,000 mL (0 mLs Intravenous Stopped 11/17/23 1313)   sodium chloride 0.9% BOLUS 1,000 mL (0 mLs Intravenous Stopped 11/17/23 1438)        Assessments:  1122 I obtained history and examined the patient as noted above.     Independent Interpretation (X-rays, CTs, rhythm strip):  None    Consultations/Discussion of Management or Tests:  None        Social Determinants of Health affecting care:   None    Disposition:  The patient was discharged to home.     Impression & Plan    CMS Diagnoses: None    Medical Decision Making:  Patient is a 77-year-old male who presents with lightheadedness and dizziness.  Patient also had elevated heart rate earlier today that was highly suspicious for paroxysmal atrial fibrillation.  EKG here shows sinus bradycardia with no concerning ischemic changes.  Patient has had laboratory work performed which shows no significant electrolyte abnormality.  Creatinine  within normal range.  No leukocytosis or anemia.  Patient had normal magnesium and thyroid function.  Troponin study has been nondynamic and lower suspicion for ACS.  Thankfully has had no chest pain or shortness of breath.  Patient was found to be orthostatic with orthostatic hypotension on arrival.  He did receive a liter of IV fluids and after the initial infusion continues to have some mild orthostasis with blood pressures 80/50 upon standing.  We had a long discussion about this and elected to perform another bolus of IV fluids with a second liter of normal saline.  After receiving this he had repeat orthostatics and felt asymptomatic throughout any positional changes.  When standing he did have a blood pressure of 91/60 but felt well and did not feel lightheaded or dizzy.  Again he has not had any syncopal events in the last 24 hours.  After the patient observation for continued cardiac monitoring and blood pressure monitoring or discharge home with plan for close follow-up with his primary care provider.  Patient elected to discharge home and I think this is a reasonable option at this time given that he has had improvement in his orthostasis throughout his course here in the ED.  We encouraged continued hydration, taking his regular medications, and close outpatient follow-up with his primary care provider and/or cardiologist.  After return precautions understood and all questions answered, discharged home.    Diagnosis:    ICD-10-CM    1. Orthostatic hypotension  I95.1       2. Paroxysmal atrial fibrillation (H)  I48.0       3. Lightheadedness  R42            Discharge Medications:  New Prescriptions    No medications on file          Scribe Disclosure:  Priyanka BOYD, am serving as a scribe at 11:19 AM on 11/17/2023 to document services personally performed by Prabhu Shelton MD based on my observations and the provider's statements to me.     11/17/2023   Prabhu Shelton MD White, [place],  MD  11/17/23 3591

## 2024-01-14 ENCOUNTER — HEALTH MAINTENANCE LETTER (OUTPATIENT)
Age: 79
End: 2024-01-14

## 2024-05-29 ENCOUNTER — ANCILLARY PROCEDURE (OUTPATIENT)
Dept: ULTRASOUND IMAGING | Facility: CLINIC | Age: 79
End: 2024-05-29
Attending: UROLOGY
Payer: MEDICARE

## 2024-05-29 DIAGNOSIS — N28.89 RENAL MASS: ICD-10-CM

## 2024-05-29 PROCEDURE — 76770 US EXAM ABDO BACK WALL COMP: CPT

## 2024-06-02 ENCOUNTER — HEALTH MAINTENANCE LETTER (OUTPATIENT)
Age: 79
End: 2024-06-02

## 2024-06-24 ENCOUNTER — APPOINTMENT (OUTPATIENT)
Dept: MRI IMAGING | Facility: CLINIC | Age: 79
End: 2024-06-24
Attending: STUDENT IN AN ORGANIZED HEALTH CARE EDUCATION/TRAINING PROGRAM
Payer: MEDICARE

## 2024-06-24 ENCOUNTER — APPOINTMENT (OUTPATIENT)
Dept: CT IMAGING | Facility: CLINIC | Age: 79
End: 2024-06-24
Attending: STUDENT IN AN ORGANIZED HEALTH CARE EDUCATION/TRAINING PROGRAM
Payer: MEDICARE

## 2024-06-24 ENCOUNTER — HOSPITAL ENCOUNTER (OUTPATIENT)
Facility: CLINIC | Age: 79
Setting detail: OBSERVATION
Discharge: HOME | End: 2024-06-25
Attending: STUDENT IN AN ORGANIZED HEALTH CARE EDUCATION/TRAINING PROGRAM | Admitting: HOSPITALIST
Payer: MEDICARE

## 2024-06-24 DIAGNOSIS — R47.9 SPEECH DISTURBANCE, UNSPECIFIED TYPE: ICD-10-CM

## 2024-06-24 LAB
ANION GAP SERPL CALCULATED.3IONS-SCNC: 10 MMOL/L (ref 7–15)
ATRIAL RATE - MUSE: 53 BPM
BASOPHILS # BLD AUTO: 0 10E3/UL (ref 0–0.2)
BASOPHILS NFR BLD AUTO: 0 %
BUN SERPL-MCNC: 30.1 MG/DL (ref 8–23)
CALCIUM SERPL-MCNC: 9.6 MG/DL (ref 8.8–10.2)
CHLORIDE SERPL-SCNC: 108 MMOL/L (ref 98–107)
CREAT SERPL-MCNC: 0.9 MG/DL (ref 0.67–1.17)
DEPRECATED HCO3 PLAS-SCNC: 27 MMOL/L (ref 22–29)
DIASTOLIC BLOOD PRESSURE - MUSE: NORMAL MMHG
EGFRCR SERPLBLD CKD-EPI 2021: 86 ML/MIN/1.73M2
EOSINOPHIL # BLD AUTO: 0.1 10E3/UL (ref 0–0.7)
EOSINOPHIL NFR BLD AUTO: 1 %
ERYTHROCYTE [DISTWIDTH] IN BLOOD BY AUTOMATED COUNT: 12.7 % (ref 10–15)
GLUCOSE SERPL-MCNC: 129 MG/DL (ref 70–99)
HCT VFR BLD AUTO: 45.4 % (ref 40–53)
HGB BLD-MCNC: 14.8 G/DL (ref 13.3–17.7)
HOLD SPECIMEN: NORMAL
HOLD SPECIMEN: NORMAL
IMM GRANULOCYTES # BLD: 0 10E3/UL
IMM GRANULOCYTES NFR BLD: 0 %
INTERPRETATION ECG - MUSE: NORMAL
LYMPHOCYTES # BLD AUTO: 2.1 10E3/UL (ref 0.8–5.3)
LYMPHOCYTES NFR BLD AUTO: 36 %
MCH RBC QN AUTO: 30.1 PG (ref 26.5–33)
MCHC RBC AUTO-ENTMCNC: 32.6 G/DL (ref 31.5–36.5)
MCV RBC AUTO: 92 FL (ref 78–100)
MONOCYTES # BLD AUTO: 0.4 10E3/UL (ref 0–1.3)
MONOCYTES NFR BLD AUTO: 7 %
NEUTROPHILS # BLD AUTO: 3.1 10E3/UL (ref 1.6–8.3)
NEUTROPHILS NFR BLD AUTO: 55 %
NRBC # BLD AUTO: 0 10E3/UL
NRBC BLD AUTO-RTO: 0 /100
P AXIS - MUSE: 4 DEGREES
PLATELET # BLD AUTO: 127 10E3/UL (ref 150–450)
POTASSIUM SERPL-SCNC: 4.5 MMOL/L (ref 3.4–5.3)
PR INTERVAL - MUSE: 164 MS
QRS DURATION - MUSE: 118 MS
QT - MUSE: 432 MS
QTC - MUSE: 405 MS
R AXIS - MUSE: -5 DEGREES
RBC # BLD AUTO: 4.92 10E6/UL (ref 4.4–5.9)
SODIUM SERPL-SCNC: 145 MMOL/L (ref 135–145)
SYSTOLIC BLOOD PRESSURE - MUSE: NORMAL MMHG
T AXIS - MUSE: 36 DEGREES
VENTRICULAR RATE- MUSE: 53 BPM
WBC # BLD AUTO: 5.7 10E3/UL (ref 4–11)

## 2024-06-24 PROCEDURE — 70553 MRI BRAIN STEM W/O & W/DYE: CPT | Mod: MA

## 2024-06-24 PROCEDURE — 99417 PROLNG OP E/M EACH 15 MIN: CPT | Performed by: HOSPITALIST

## 2024-06-24 PROCEDURE — 258N000003 HC RX IP 258 OP 636: Mod: JZ | Performed by: STUDENT IN AN ORGANIZED HEALTH CARE EDUCATION/TRAINING PROGRAM

## 2024-06-24 PROCEDURE — 96360 HYDRATION IV INFUSION INIT: CPT | Mod: 59

## 2024-06-24 PROCEDURE — 250N000009 HC RX 250: Performed by: STUDENT IN AN ORGANIZED HEALTH CARE EDUCATION/TRAINING PROGRAM

## 2024-06-24 PROCEDURE — 70450 CT HEAD/BRAIN W/O DYE: CPT | Mod: MA,XU

## 2024-06-24 PROCEDURE — 83036 HEMOGLOBIN GLYCOSYLATED A1C: CPT | Performed by: HOSPITALIST

## 2024-06-24 PROCEDURE — 99285 EMERGENCY DEPT VISIT HI MDM: CPT | Mod: 25

## 2024-06-24 PROCEDURE — 36415 COLL VENOUS BLD VENIPUNCTURE: CPT | Performed by: STUDENT IN AN ORGANIZED HEALTH CARE EDUCATION/TRAINING PROGRAM

## 2024-06-24 PROCEDURE — 250N000011 HC RX IP 250 OP 636: Performed by: STUDENT IN AN ORGANIZED HEALTH CARE EDUCATION/TRAINING PROGRAM

## 2024-06-24 PROCEDURE — 250N000013 HC RX MED GY IP 250 OP 250 PS 637: Performed by: HOSPITALIST

## 2024-06-24 PROCEDURE — 255N000002 HC RX 255 OP 636: Performed by: STUDENT IN AN ORGANIZED HEALTH CARE EDUCATION/TRAINING PROGRAM

## 2024-06-24 PROCEDURE — 70496 CT ANGIOGRAPHY HEAD: CPT | Mod: MA

## 2024-06-24 PROCEDURE — 99205 OFFICE O/P NEW HI 60 MIN: CPT | Performed by: HOSPITALIST

## 2024-06-24 PROCEDURE — 93005 ELECTROCARDIOGRAM TRACING: CPT

## 2024-06-24 PROCEDURE — G0378 HOSPITAL OBSERVATION PER HR: HCPCS

## 2024-06-24 PROCEDURE — 80048 BASIC METABOLIC PNL TOTAL CA: CPT | Performed by: STUDENT IN AN ORGANIZED HEALTH CARE EDUCATION/TRAINING PROGRAM

## 2024-06-24 PROCEDURE — 85025 COMPLETE CBC W/AUTO DIFF WBC: CPT | Performed by: STUDENT IN AN ORGANIZED HEALTH CARE EDUCATION/TRAINING PROGRAM

## 2024-06-24 PROCEDURE — A9585 GADOBUTROL INJECTION: HCPCS | Performed by: STUDENT IN AN ORGANIZED HEALTH CARE EDUCATION/TRAINING PROGRAM

## 2024-06-24 RX ORDER — CARBIDOPA AND LEVODOPA 50; 200 MG/1; MG/1
1 TABLET, EXTENDED RELEASE ORAL ONCE
Status: COMPLETED | OUTPATIENT
Start: 2024-06-24 | End: 2024-06-24

## 2024-06-24 RX ORDER — IOPAMIDOL 755 MG/ML
67 INJECTION, SOLUTION INTRAVASCULAR ONCE
Status: COMPLETED | OUTPATIENT
Start: 2024-06-24 | End: 2024-06-24

## 2024-06-24 RX ORDER — ASPIRIN 81 MG/1
81 TABLET ORAL DAILY
Qty: 30 TABLET | Refills: 0 | Status: SHIPPED | OUTPATIENT
Start: 2024-06-24 | End: 2024-06-24

## 2024-06-24 RX ORDER — GADOBUTROL 604.72 MG/ML
7 INJECTION INTRAVENOUS ONCE
Status: COMPLETED | OUTPATIENT
Start: 2024-06-24 | End: 2024-06-24

## 2024-06-24 RX ORDER — FINASTERIDE 5 MG/1
5 TABLET, FILM COATED ORAL
COMMUNITY

## 2024-06-24 RX ORDER — LOSARTAN POTASSIUM 50 MG/1
50 TABLET ORAL
COMMUNITY

## 2024-06-24 RX ORDER — CYANOCOBALAMIN (VITAMIN B-12) 500 MCG
0.8 TABLET ORAL
COMMUNITY

## 2024-06-24 RX ADMIN — SODIUM CHLORIDE 100 ML: 9 INJECTION, SOLUTION INTRAVENOUS at 12:58

## 2024-06-24 RX ADMIN — SODIUM CHLORIDE 500 ML: 9 INJECTION, SOLUTION INTRAVENOUS at 14:39

## 2024-06-24 RX ADMIN — CARBIDOPA AND LEVODOPA 1 TABLET: 50; 200 TABLET, EXTENDED RELEASE ORAL at 22:56

## 2024-06-24 RX ADMIN — GADOBUTROL 7 ML: 604.72 INJECTION INTRAVENOUS at 19:36

## 2024-06-24 RX ADMIN — IOPAMIDOL 67 ML: 755 INJECTION, SOLUTION INTRAVENOUS at 12:56

## 2024-06-24 ASSESSMENT — COLUMBIA-SUICIDE SEVERITY RATING SCALE - C-SSRS
1. IN THE PAST MONTH, HAVE YOU WISHED YOU WERE DEAD OR WISHED YOU COULD GO TO SLEEP AND NOT WAKE UP?: NO
6. HAVE YOU EVER DONE ANYTHING, STARTED TO DO ANYTHING, OR PREPARED TO DO ANYTHING TO END YOUR LIFE?: NO
2. HAVE YOU ACTUALLY HAD ANY THOUGHTS OF KILLING YOURSELF IN THE PAST MONTH?: NO

## 2024-06-24 ASSESSMENT — ACTIVITIES OF DAILY LIVING (ADL)
ADLS_ACUITY_SCORE: 35

## 2024-06-24 NOTE — ED PROVIDER NOTES
Emergency Department Note      History of Present Illness     Chief Complaint  Slurred Speech    HPI  Kingston Antoine is a 78 year old male on Eliquis with a history of dementia presenting with slurred speech that occurred about an hour prior to examination (1030). Patient notes he was unable to say the words he was thinking, and just sounds were coming out. His partner notes the episode was short-lived. He notes stuttering at baseline. He felt normal prior to going to bed last night, when he went to the bathroom last night, and when he woke up this morning at 0700. Patient feels normal upon examination. Kingston notes a history of similar symptoms about 5 years ago, and was diagnosed with a TIA. He endorses recent vertigo, that was relieved by a maneuver. Patient denies chest pain, palpitations, or difficulty walking. No recent falls or injuries. No recent changes to medications.     Independent Historian  Partner present at bedside and provided partial history.     Review of External Notes  None  Past Medical History   Medical History and Problem List  Antiplatelet or antithrombotic long-term use  Arrhythmia  Afib, paroxsymal   ASD  CKD   Diabetes type 2  GERD  Hypertension  Hernia   Migraine  MCI  Parkinson disease   TIA    Medications  Eliquis   Finasteride   Sinemet  Pantoprazole   Sildenafil   Sotalol     Surgical History   Anal fistula repair   Arthroscopy knee   Cholecystectomy   Colonoscopy   Excision lesion eyelid   GI surgery   Hernia repair   Tonsillectomy  Adenoidectomy   Skin biopsy   Sigmoidoscopy     Physical Exam   Patient Vitals for the past 24 hrs:   BP Temp Temp src Pulse Resp SpO2 Weight   06/24/24 2135 -- -- -- -- -- 97 % --   06/24/24 2134 (!) 175/78 -- -- 60 -- -- --   06/24/24 1800 (!) 143/73 -- -- 52 -- -- --   06/24/24 1515 (!) 161/78 -- -- -- -- 100 % --   06/24/24 1400 (!) 166/84 -- -- 50 -- 99 % --   06/24/24 1126 138/61 97.6  F (36.4  C) Temporal 54 18 99 % 72.6 kg (160 lb)      Physical Exam  General: Awake, alert, in no acute distress   HEENT: Atraumatic   EOM normal   External ears normal   Trachea midline  Neck: Supple, normal ROM  CV: bradycardic rate, regular rhythm   No lower extremity edema  2+ radial and DP pulses  PULM: Breath sounds normal bilaterally  No wheezes or rales  ABD: Soft, non-tender, non-distended  Normal bowel sounds   No rebound or guarding   MSK: No gross deformities  NEURO: Alert, no focal deficits. 5/5 strength in bilateral upper and lower extremities. No gross sensory deficits.  Patient moves in a coordinated fashion. Cranial nerves 2-12 intact. Cerebellar testing intact. Pill rolling tremor. Bradykinesia.   Skin: Warm, dry and intact     Diagnostics   Lab Results   Labs Ordered and Resulted from Time of ED Arrival to Time of ED Departure   BASIC METABOLIC PANEL - Abnormal       Result Value    Sodium 145      Potassium 4.5      Chloride 108 (*)     Carbon Dioxide (CO2) 27      Anion Gap 10      Urea Nitrogen 30.1 (*)     Creatinine 0.90      GFR Estimate 86      Calcium 9.6      Glucose 129 (*)    CBC WITH PLATELETS AND DIFFERENTIAL - Abnormal    WBC Count 5.7      RBC Count 4.92      Hemoglobin 14.8      Hematocrit 45.4      MCV 92      MCH 30.1      MCHC 32.6      RDW 12.7      Platelet Count 127 (*)     % Neutrophils 55      % Lymphocytes 36      % Monocytes 7      % Eosinophils 1      % Basophils 0      % Immature Granulocytes 0      NRBCs per 100 WBC 0      Absolute Neutrophils 3.1      Absolute Lymphocytes 2.1      Absolute Monocytes 0.4      Absolute Eosinophils 0.1      Absolute Basophils 0.0      Absolute Immature Granulocytes 0.0      Absolute NRBCs 0.0         Imaging  MR Brain w/o & w Contrast   Final Result   IMPRESSION:   1.  Mild age-related changes without acute intracranial abnormality.   2.  Small area of susceptibility in the left parietal lobe without edema, mass effect or enhancement. This likely represents sequela of a remote  hemorrhage. Underlying cavernous malformation would be an alternative, however less likely consideration.      CTA Head Neck with Contrast   Final Result   IMPRESSION:    1. No high-grade stenosis or large vessel occlusion of the major   intracranial arteries.    2. No intracranial aneurysm or high-flow vascular malformation.   3. Patent cervical carotid or vertebral arteries without significant   stenosis. No definite findings of dissection.      JOSE GIBBONS MD            SYSTEM ID:  [id number]      CT Head w/o Contrast   Final Result   IMPRESSION:    1. No CT findings of acute intracranial process.   2. Mild presumed age-related changes of the brain, as described.      JOSE GIBBONS MD            SYSTEM ID:  [id number]      Head CT w/o contrast    (Results Pending)       EKG   ECG results from 06/24/24   EKG 12-lead, tracing only     Value    Systolic Blood Pressure     Diastolic Blood Pressure     Ventricular Rate 53    Atrial Rate 53    TX Interval 164    QRS Duration 118        QTc 405    P Axis 4    R AXIS -5    T Axis 36    Interpretation ECG      Sinus bradycardia with Premature atrial complexes  Incomplete right bundle branch block  Borderline ECG  When compared with ECG of 17-NOV-2023 09:45,  No significant change  EKG interpreted by me at 1206     Independent Interpretation  CT Head: No intracranial hemorrhage.  ED Course    Medications Administered  Medications   carbidopa-levodopa (SINEMET CR)  MG per CR tablet 1 tablet (has no administration in time range)   iopamidol (ISOVUE-370) solution 67 mL (67 mLs Intravenous $Given 6/24/24 1256)   Saline Flush (100 mLs Intravenous $Given 6/24/24 1258)   sodium chloride 0.9% BOLUS 500 mL (0 mLs Intravenous Stopped 6/24/24 1517)   gadobutrol (GADAVIST) injection 7 mL (7 mLs Intravenous $Given 6/24/24 1936)       Procedures  Procedures     Discussion of Management  None    Social Determinants of Health adding to complexity of care  None    ED Course  ED  Course as of 06/24/24 2222   Mon Jun 24, 2024   1134 I obtained the history and examined the patient as noted above.      1635 I rechecked and updated the patient.  Patient is agreeable to stay for MRI.    2005 Spoke with stroke neurology Dr. Ritter.    2059 Spoke with stroke neurology. Recommend admission, repeat CT head in 6 hours, routine stroke workup, eeg in AM   2100 Re-eval. Patient amenable to admission   2105 Consulted hospitalist Dr. Parra who kindly accepts admission     Medical Decision Making / Diagnosis   CMS Diagnoses: None    MIPS     None    MDM  Kingston Antoine is a 78 year old male who presents with less than 30-minute episode of resolved aphasia.  Consider broad differential including electrolyte abnormality, infection, TIA.  Is anticoagulated on Eliquis without missed doses.  EKG does not show A-fib currently.  CT head and CTA of the neck do not identify any acute abnormalities.  MRI brain shows potential infarction in the left parietal lobe of indeterminate age.  Unlikely AVM given no vascular malformations noted on CTA head.     In discussion with stroke neurology, no additional medication administration at this time as he is on Eliquis.  Recommendation is for CT head 6 hours post MRI approximately 1 AM (will order as timed), routine stroke workup with observation admission, EEG in the morning to definitively rule out focal seizure.  Patient was not postictal after event.  Labs notable for mild BUN to creatinine elevation suggestive of prerenal dehydration.  Neuroexam unchanged other than findings expected with his Parkinson's disease.  Patient is amenable to admission, admitted to hospitalist Dr. Parra.    Disposition  Patient was admitted to the hospital.    ICD-10 Codes:    ICD-10-CM    1. Speech disturbance, unspecified type  R47.9              Scribe Disclosure:  Amber BOYD am serving as a scribe at 12:02 PM on 6/24/2024 to document services personally performed by [facility] [place]  Christina Perales,  based on my observations and the provider's statements to me.        TravisChristina Novak,   06/24/24 2518

## 2024-06-24 NOTE — ED NOTES
Pt was able to ambulate down the amaro and back without assistance. Pt stated some dizziness but not worse than normal.    Unable to determine, pt does not remember

## 2024-06-25 ENCOUNTER — HOSPITAL ENCOUNTER (OUTPATIENT)
Dept: NEUROLOGY | Facility: CLINIC | Age: 79
Setting detail: OBSERVATION
Discharge: HOME | End: 2024-06-25
Attending: HOSPITALIST
Payer: MEDICARE

## 2024-06-25 ENCOUNTER — APPOINTMENT (OUTPATIENT)
Dept: CARDIOLOGY | Facility: CLINIC | Age: 79
End: 2024-06-25
Payer: MEDICARE

## 2024-06-25 ENCOUNTER — APPOINTMENT (OUTPATIENT)
Dept: CT IMAGING | Facility: CLINIC | Age: 79
End: 2024-06-25
Attending: STUDENT IN AN ORGANIZED HEALTH CARE EDUCATION/TRAINING PROGRAM
Payer: MEDICARE

## 2024-06-25 VITALS
RESPIRATION RATE: 18 BRPM | BODY MASS INDEX: 24.33 KG/M2 | DIASTOLIC BLOOD PRESSURE: 68 MMHG | SYSTOLIC BLOOD PRESSURE: 139 MMHG | OXYGEN SATURATION: 97 % | WEIGHT: 160 LBS | HEART RATE: 50 BPM | TEMPERATURE: 98.4 F

## 2024-06-25 LAB
ANION GAP SERPL CALCULATED.3IONS-SCNC: 9 MMOL/L (ref 7–15)
BASOPHILS # BLD AUTO: 0 10E3/UL (ref 0–0.2)
BASOPHILS NFR BLD AUTO: 0 %
BUN SERPL-MCNC: 22.4 MG/DL (ref 8–23)
CALCIUM SERPL-MCNC: 9.6 MG/DL (ref 8.8–10.2)
CHLORIDE SERPL-SCNC: 104 MMOL/L (ref 98–107)
CHOLEST SERPL-MCNC: 126 MG/DL
CREAT SERPL-MCNC: 0.71 MG/DL (ref 0.67–1.17)
DEPRECATED HCO3 PLAS-SCNC: 27 MMOL/L (ref 22–29)
EGFRCR SERPLBLD CKD-EPI 2021: >90 ML/MIN/1.73M2
EOSINOPHIL # BLD AUTO: 0.1 10E3/UL (ref 0–0.7)
EOSINOPHIL NFR BLD AUTO: 1 %
ERYTHROCYTE [DISTWIDTH] IN BLOOD BY AUTOMATED COUNT: 12.6 % (ref 10–15)
FASTING STATUS PATIENT QL REPORTED: YES
GLUCOSE BLDC GLUCOMTR-MCNC: 137 MG/DL (ref 70–99)
GLUCOSE BLDC GLUCOMTR-MCNC: 147 MG/DL (ref 70–99)
GLUCOSE BLDC GLUCOMTR-MCNC: 90 MG/DL (ref 70–99)
GLUCOSE SERPL-MCNC: 159 MG/DL (ref 70–99)
HBA1C MFR BLD: 6.4 %
HCT VFR BLD AUTO: 43.4 % (ref 40–53)
HDLC SERPL-MCNC: 44 MG/DL
HGB BLD-MCNC: 14.2 G/DL (ref 13.3–17.7)
IMM GRANULOCYTES # BLD: 0 10E3/UL
IMM GRANULOCYTES NFR BLD: 0 %
LDLC SERPL CALC-MCNC: 63 MG/DL
LVEF ECHO: NORMAL
LYMPHOCYTES # BLD AUTO: 2 10E3/UL (ref 0.8–5.3)
LYMPHOCYTES NFR BLD AUTO: 36 %
MCH RBC QN AUTO: 29.9 PG (ref 26.5–33)
MCHC RBC AUTO-ENTMCNC: 32.7 G/DL (ref 31.5–36.5)
MCV RBC AUTO: 91 FL (ref 78–100)
MONOCYTES # BLD AUTO: 0.4 10E3/UL (ref 0–1.3)
MONOCYTES NFR BLD AUTO: 7 %
NEUTROPHILS # BLD AUTO: 3 10E3/UL (ref 1.6–8.3)
NEUTROPHILS NFR BLD AUTO: 55 %
NONHDLC SERPL-MCNC: 82 MG/DL
NRBC # BLD AUTO: 0 10E3/UL
NRBC BLD AUTO-RTO: 0 /100
PLATELET # BLD AUTO: 126 10E3/UL (ref 150–450)
POTASSIUM SERPL-SCNC: 4.1 MMOL/L (ref 3.4–5.3)
RBC # BLD AUTO: 4.75 10E6/UL (ref 4.4–5.9)
SODIUM SERPL-SCNC: 140 MMOL/L (ref 135–145)
TRIGL SERPL-MCNC: 97 MG/DL
WBC # BLD AUTO: 5.5 10E3/UL (ref 4–11)

## 2024-06-25 PROCEDURE — 82465 ASSAY BLD/SERUM CHOLESTEROL: CPT | Performed by: HOSPITALIST

## 2024-06-25 PROCEDURE — 93306 TTE W/DOPPLER COMPLETE: CPT | Mod: 26 | Performed by: INTERNAL MEDICINE

## 2024-06-25 PROCEDURE — 99222 1ST HOSP IP/OBS MODERATE 55: CPT | Mod: FS

## 2024-06-25 PROCEDURE — 999N000208 ECHOCARDIOGRAM COMPLETE

## 2024-06-25 PROCEDURE — 70450 CT HEAD/BRAIN W/O DYE: CPT | Mod: MA

## 2024-06-25 PROCEDURE — G0378 HOSPITAL OBSERVATION PER HR: HCPCS

## 2024-06-25 PROCEDURE — 250N000013 HC RX MED GY IP 250 OP 250 PS 637

## 2024-06-25 PROCEDURE — 250N000013 HC RX MED GY IP 250 OP 250 PS 637: Performed by: HOSPITALIST

## 2024-06-25 PROCEDURE — 95816 EEG AWAKE AND DROWSY: CPT

## 2024-06-25 PROCEDURE — 85025 COMPLETE CBC W/AUTO DIFF WBC: CPT | Performed by: HOSPITALIST

## 2024-06-25 PROCEDURE — 255N000002 HC RX 255 OP 636: Performed by: HOSPITALIST

## 2024-06-25 PROCEDURE — 99214 OFFICE O/P EST MOD 30 MIN: CPT | Performed by: PHYSICIAN ASSISTANT

## 2024-06-25 PROCEDURE — 95816 EEG AWAKE AND DROWSY: CPT | Mod: 26 | Performed by: PSYCHIATRY & NEUROLOGY

## 2024-06-25 PROCEDURE — 83718 ASSAY OF LIPOPROTEIN: CPT | Performed by: HOSPITALIST

## 2024-06-25 PROCEDURE — 36415 COLL VENOUS BLD VENIPUNCTURE: CPT | Performed by: HOSPITALIST

## 2024-06-25 PROCEDURE — 80048 BASIC METABOLIC PNL TOTAL CA: CPT | Performed by: HOSPITALIST

## 2024-06-25 PROCEDURE — 999N000226 HC STATISTIC SLP IP EVAL DEFER: Performed by: SPEECH-LANGUAGE PATHOLOGIST

## 2024-06-25 PROCEDURE — 82962 GLUCOSE BLOOD TEST: CPT

## 2024-06-25 RX ORDER — LANOLIN ALCOHOL/MO/W.PET/CERES
3 CREAM (GRAM) TOPICAL
Status: DISCONTINUED | OUTPATIENT
Start: 2024-06-25 | End: 2024-06-25 | Stop reason: HOSPADM

## 2024-06-25 RX ORDER — DEXTROSE MONOHYDRATE 25 G/50ML
25-50 INJECTION, SOLUTION INTRAVENOUS
Status: DISCONTINUED | OUTPATIENT
Start: 2024-06-25 | End: 2024-06-25 | Stop reason: HOSPADM

## 2024-06-25 RX ORDER — ONDANSETRON 2 MG/ML
4 INJECTION INTRAMUSCULAR; INTRAVENOUS EVERY 6 HOURS PRN
Status: DISCONTINUED | OUTPATIENT
Start: 2024-06-25 | End: 2024-06-25 | Stop reason: HOSPADM

## 2024-06-25 RX ORDER — GUAIFENESIN 200 MG/10ML
200 LIQUID ORAL EVERY 4 HOURS PRN
Status: DISCONTINUED | OUTPATIENT
Start: 2024-06-25 | End: 2024-06-25 | Stop reason: HOSPADM

## 2024-06-25 RX ORDER — ACETAMINOPHEN 650 MG/1
650 SUPPOSITORY RECTAL EVERY 4 HOURS PRN
Status: DISCONTINUED | OUTPATIENT
Start: 2024-06-25 | End: 2024-06-25 | Stop reason: HOSPADM

## 2024-06-25 RX ORDER — HYDRALAZINE HYDROCHLORIDE 20 MG/ML
10-20 INJECTION INTRAMUSCULAR; INTRAVENOUS
Status: DISCONTINUED | OUTPATIENT
Start: 2024-06-25 | End: 2024-06-25 | Stop reason: HOSPADM

## 2024-06-25 RX ORDER — LABETALOL HYDROCHLORIDE 5 MG/ML
10-40 INJECTION, SOLUTION INTRAVENOUS EVERY 10 MIN PRN
Status: DISCONTINUED | OUTPATIENT
Start: 2024-06-25 | End: 2024-06-25 | Stop reason: HOSPADM

## 2024-06-25 RX ORDER — CARBIDOPA AND LEVODOPA 50; 200 MG/1; MG/1
1 TABLET, EXTENDED RELEASE ORAL 4 TIMES DAILY
Status: DISCONTINUED | OUTPATIENT
Start: 2024-06-25 | End: 2024-06-25 | Stop reason: HOSPADM

## 2024-06-25 RX ORDER — ACETAMINOPHEN 325 MG/10.15ML
650 LIQUID ORAL EVERY 4 HOURS PRN
Status: DISCONTINUED | OUTPATIENT
Start: 2024-06-25 | End: 2024-06-25 | Stop reason: HOSPADM

## 2024-06-25 RX ORDER — ONDANSETRON 4 MG/1
4 TABLET, ORALLY DISINTEGRATING ORAL EVERY 6 HOURS PRN
Status: DISCONTINUED | OUTPATIENT
Start: 2024-06-25 | End: 2024-06-25 | Stop reason: HOSPADM

## 2024-06-25 RX ORDER — ACETAMINOPHEN 325 MG/1
650 TABLET ORAL EVERY 4 HOURS PRN
Status: DISCONTINUED | OUTPATIENT
Start: 2024-06-25 | End: 2024-06-25 | Stop reason: HOSPADM

## 2024-06-25 RX ORDER — CARBIDOPA AND LEVODOPA 25; 100 MG/1; MG/1
1 TABLET, EXTENDED RELEASE ORAL 2 TIMES DAILY
Status: DISCONTINUED | OUTPATIENT
Start: 2024-06-25 | End: 2024-06-25 | Stop reason: HOSPADM

## 2024-06-25 RX ORDER — FINASTERIDE 5 MG/1
5 TABLET, FILM COATED ORAL
Status: DISCONTINUED | OUTPATIENT
Start: 2024-06-26 | End: 2024-06-25 | Stop reason: HOSPADM

## 2024-06-25 RX ORDER — AMOXICILLIN 250 MG
1-2 CAPSULE ORAL 2 TIMES DAILY
Status: DISCONTINUED | OUTPATIENT
Start: 2024-06-25 | End: 2024-06-25 | Stop reason: HOSPADM

## 2024-06-25 RX ORDER — NICOTINE POLACRILEX 4 MG
15-30 LOZENGE BUCCAL
Status: DISCONTINUED | OUTPATIENT
Start: 2024-06-25 | End: 2024-06-25 | Stop reason: HOSPADM

## 2024-06-25 RX ADMIN — APIXABAN 5 MG: 5 TABLET, FILM COATED ORAL at 10:17

## 2024-06-25 RX ADMIN — CARBIDOPA AND LEVODOPA 1 TABLET: 50; 200 TABLET, EXTENDED RELEASE ORAL at 10:16

## 2024-06-25 RX ADMIN — SOTALOL HYDROCHLORIDE 40 MG: 80 TABLET ORAL at 02:39

## 2024-06-25 RX ADMIN — ACETAMINOPHEN 650 MG: 325 TABLET, FILM COATED ORAL at 06:28

## 2024-06-25 RX ADMIN — SENNOSIDES AND DOCUSATE SODIUM 1 TABLET: 50; 8.6 TABLET ORAL at 08:41

## 2024-06-25 RX ADMIN — CARBIDOPA AND LEVODOPA 1 TABLET: 25; 100 TABLET, EXTENDED RELEASE ORAL at 10:16

## 2024-06-25 RX ADMIN — SENNOSIDES AND DOCUSATE SODIUM 1 TABLET: 50; 8.6 TABLET ORAL at 02:07

## 2024-06-25 RX ADMIN — HUMAN ALBUMIN MICROSPHERES AND PERFLUTREN 3 ML: 10; .22 INJECTION, SOLUTION INTRAVENOUS at 12:42

## 2024-06-25 RX ADMIN — CARBIDOPA AND LEVODOPA 1 TABLET: 25; 100 TABLET, EXTENDED RELEASE ORAL at 06:19

## 2024-06-25 RX ADMIN — CARBIDOPA AND LEVODOPA 1 TABLET: 50; 200 TABLET, EXTENDED RELEASE ORAL at 06:19

## 2024-06-25 RX ADMIN — SOTALOL HYDROCHLORIDE 40 MG: 80 TABLET ORAL at 08:41

## 2024-06-25 ASSESSMENT — ACTIVITIES OF DAILY LIVING (ADL)
ADLS_ACUITY_SCORE: 28
ADLS_ACUITY_SCORE: 32
ADLS_ACUITY_SCORE: 28
ADLS_ACUITY_SCORE: 32
ADLS_ACUITY_SCORE: 28
ADLS_ACUITY_SCORE: 37
ADLS_ACUITY_SCORE: 32
ADLS_ACUITY_SCORE: 27
ADLS_ACUITY_SCORE: 28
ADLS_ACUITY_SCORE: 35
ADLS_ACUITY_SCORE: 27
ADLS_ACUITY_SCORE: 31
ADLS_ACUITY_SCORE: 28
ADLS_ACUITY_SCORE: 31

## 2024-06-25 NOTE — PROGRESS NOTES
RECEIVING UNIT ED HANDOFF REVIEW    ED Nurse Handoff Report was reviewed by: Julieta Arenas RN on June 25, 2024 at 12:10 AM

## 2024-06-25 NOTE — PLAN OF CARE
Smoking Cessation: orders received, pt is not a smoker; met with patient briefly in room.     Pt with questions regarding his parkinson's and reports his gait has gotten worse lately and continues to get worse over time (years). Needed to use bathroom while therapist in room, IND w/ sit > stand from chair, SBA ambulation in room to bathroom no assistive device, slight rigidity with mobility but no significant concerns at this time. Discussed w/ RN that if they mobilize patient and pt has significant unsteadiness that is far from pts baseline then to request order other wise defer to outpatient therapy. Discussed BIG and LOUD programs with patient to continue to manage his parkinson's.

## 2024-06-25 NOTE — DISCHARGE SUMMARY
[facility]  Hospitalist Discharge Summary      Date of Admission:  6/24/2024  Date of Discharge:  6/25/2024  Discharging Provider: Isela Krishnamurthy PA-C  Discharge Service: Hospitalist Service    Discharge Diagnoses   Possible TIA  Episode of world finding difficulty, unclear etiology     Clinically Significant Risk Factors          Follow-ups Needed After Discharge   Follow-up Appointments     Follow-up and recommended labs and tests       Follow up with primary care provider, Dong Encarnacion, within 7-14 days for   hospital follow- up.  The following labs/tests are recommended: Repeat   basic labs as needed.    Follow up with your established neurologist in 6-8 weeks          Discharge Disposition   Discharged to home  Condition at discharge: Stable    Hospital Course   Kingston Antoine is a markedly pleasant 78 year old gentleman with past medical history that is most notable for Parkinson's Disease, as well as chronic paroxysmal atrial fibrillation on Eliquis, prior TIA, PFO, and Type 2 Diabetes mellitus, among multiple others; who presents with transient expressive aphasia and is found to have suspected TIA.  Full HPI please see admission H&P from Dr. Carlton Parra dated 6/24/2024.     Possible TIA  Hx Parkinson's Disease and has suffered from migraine headaches in the past. He suffered a previous TIA in 3/2019, documented in his record has having had sudden onset of transient dysarthria and right visual field defect while vacationing in [place], which lasted for 15 minutes then resolved TTE at that time showed preserved LVEF with a Grade 2 PFO. Subsequent testing in 2019 revealed occult atrial fibrillation. Eliquis was started and has been continued.     Presented to the ED with transient expressive aphasia, which resolved after 5 minutes. In the ED, he is afebrile. He has sinus bradycardia. He has accelerated hypertension without signs of hypertensive crisis. He is not hypoxic.   Stable creatinine.  No leukocytosis.  EKG shows PAC's. CT and CTA of head and neck show no acute intracranial processes, no high-grade stenosis or large vessel occlusion of the major intracranial arteries, and no intracranial aneurysm or high-flow vascular malformation or signs of dissection. MRI of the brain reveals a small area of susceptibility in the left parietal lobe without edema, mass effect or enhancement. Overall, unclear etiology and differential included TIA versus subclinical seizure which may stem from chronic left parietal lobe hemorrhage versus transient speech changes versus progression of underlying Parkinsonism and dementia.      Registered to observation and stroke neurology was consulted.  He was monitored with neurological checks which remained stable.  It he had a TTE which revealed EF 60-65%, no cardiac embolus, mild concentric LVH, grade 1 early diastolic dysfunction, no regional wall abnormalities.  EEG without seizure activity or epileptiform discharges.  Notes mild diffuse nonspecific encephalopathy.  Lipid panel within normal limits.  A1c 6.4%.  Workup largely negative and approved for discharge from stroke neurology.  Recommended to resume prior to admission apixaban.  Patient will follow-up with primary care provider in 1 to 2 weeks as well as established neurologist in 6 to 8 weeks.     Chronic paroxysmal atrial fibrillation  Followed by [facility]. He has undergone prior DCCV in 9/2019 in [place], [place]. He is on Sotalol as well as Eliquis. QTc is 406 (not prolonged) on EKG on admission. Prior coronary CT in 2014 had shown mildly elevated calcium score.  Continued on PTA sotalol and Eliquis.       Hypertension: Resume home Maxzide and Losartan discharge     Chronic thrombocytopenia: Mild. Currently 127. Monitor closely, stable.     Parkinson's Disease: Causing autonomic dysfunction and early dementia, as per his most recent outpatient Neurology clinic (the  [facility]) note from 5/2024. Fall precautions. Home Sinemet resumed     Chronic kidney disease, stage 3  Baseline Cr 0.9-1. On admission, 0.9.  - Avoid nephrotoxins - contrast, NSAIDs  - Renally dose medications as able/needed  - Monitor, with primary care     History of urothelial cancer: Followed by [place] [facility]; low grade and under surveillance. He also has BPH and is status post prior TURP. Resume home Proscar      Non-insulin-dependent type 2 Diabetes mellitus, controlled: A1c 7.1 in 2022. Diet controlled as an outpatient.  Repeat A1c 6.4%.     GERD with hiatal hernia: Recent EGD as an outpatient 5/24/2024 with biopsies reportedly was negative for malignancy.  Continue PTA PPI.      Consultations This Hospital Stay   SPEECH LANGUAGE PATH ADULT IP CONSULT  SMOKING CESSATION PROGRAM IP CONSULT  NEUROLOGY IP STROKE CONSULT    Code Status   Full Code    Time Spent on this Encounter   I, Isela Krishnamurthy PA-C, personally saw the patient today and spent greater than 30 minutes discharging this patient.       Isela Krishnamurthy PA-C  [facility] NEUROSCIENCE UNIT  [address] [place] [address]  Phone: [phone]  ______________________________________________________________________    Physical Exam   Vital Signs: Temp: 98.4  F (36.9  C) Temp src: Oral BP: 139/68 Pulse: 50   Resp: 18 SpO2: 97 % O2 Device: None (Room air)    Weight: 160 lbs 0 oz    Physical Exam    General: Awake, alert, very pleasant gentleman who appears stated age. Looks comfortable sitting up in chair. No acute distress.  HEENT: Normocephalic, atraumatic. Extraocular movements intact.  Facial movements intact.  Respiratory: Clear to auscultation bilaterally, no rales, wheezing, or rhonchi.  Cardiovascular: Regular rate and rhythm, +S1 and S2, no murmur auscultated. No peripheral edema.   Gastrointestinal: Soft, non-tender, non-distended. Bowel sounds present.  Skin: Warm, dry. No obvious rashes or lesions on  exposed skin. Dorsalis pedis pulses palpable bilaterally.  Musculoskeletal: No joint swelling, erythema or tenderness. Moves all extremities equally.  Shuffling gait.  Neurologic: AAO x3.  Resting tremor.  No obvious drift.  Full strength bilaterally upper and lower extremities.  Psychiatric: Appropriate mood and affect. No obvious anxiety or depression.         Primary Care Physician   Dong Encarnacion    Discharge Orders      Reason for your hospital stay    You were admitted to the hospital with word finding difficulty and suspected transient ischemic attack.     Follow-up and recommended labs and tests     Follow up with primary care provider, Dong Encarnacion, within 7-14 days for hospital follow- up.  The following labs/tests are recommended: Repeat basic labs as needed.    Follow up with your established neurologist in 6-8 weeks     Activity    Your activity upon discharge: activity as tolerated     Diet    Follow this diet upon discharge: Orders Placed This Encounter      Moderate Consistent Carb (60 g CHO per Meal) Diet       Significant Results and Procedures   Results for orders placed or performed during the hospital encounter of 06/24/24   CT Head w/o Contrast    Narrative    CT SCAN OF THE HEAD WITHOUT CONTRAST June 24, 2024 1:10 PM     HISTORY: Episode of aphasia, resolved.    TECHNIQUE: Axial images of the head and coronal reformations without  IV contrast material. Radiation dose for this scan was reduced using  automated exposure control, adjustment of the mA and/or kV according  to patient size, or iterative reconstruction technique.    COMPARISON: MRI of the brain dated 5/21/2018.    FINDINGS: There is no evidence of intracranial hemorrhage, mass, acute  infarct or anomaly. The ventricles are normal in size, shape and  configuration. Mild diffuse parenchymal volume loss. Mild patchy  periventricular white matter hypodensities which are nonspecific, but  likely related to chronic microvascular ischemic  disease.     Bilateral lens implants. Multiple polypoid lesions in the bilateral  maxillary and left sphenoid sinuses, nonspecific, potentially  representing retention cysts or polyps. Lobulated calcified or  ossified lesion in the left frontal sinus may represent an osteoma.  The mastoid and middle ear cavities appear grossly clear. Right  temporomandibular joint degenerative changes. The bony calvarium and  bones of the skull base appear intact.       Impression    IMPRESSION:   1. No CT findings of acute intracranial process.  2. Mild presumed age-related changes of the brain, as described.    JOSE GIBBONS MD         SYSTEM ID:  [id number]   CTA Head Neck with Contrast    Narrative    CT ANGIOGRAM OF THE HEAD AND NECK WITH CONTRAST  6/24/2024 1:11 PM     HISTORY: Episode of aphasia, resolved.    TECHNIQUE:  CT angiography with an injection of 67 mL Isovue 370 IV  with scans through the head and neck. Images were transferred to a  separate 3-D workstation where multiplanar reformations and 3-D images  were created. Estimates of carotid stenoses are made relative to the  distal internal carotid artery diameters except as noted. Radiation  dose for this scan was reduced using automated exposure control,  adjustment of the mA and/or kV according to patient size, or iterative  reconstruction technique.    COMPARISON: CT head same day.     CT ANGIOGRAM HEAD FINDINGS: There is atherosclerosis involving the  bilateral carotid siphons, without flow-limiting stenosis. The major  proximal branches of the anterior cerebral and middle cerebral  arteries appear patent. Dominant right and developmentally smaller  left vertebral arteries. The left vertebral artery terminates in the  left posterior inferior cerebral artery, a normal variant. The basilar  artery appears patent. The proximal branches of the posterior cerebral  arteries are patent.    CT ANGIOGRAM NECK FINDINGS: Normal origin of the great vessels from  the aortic  arch.     Right carotid artery: The right common and internal carotid arteries  are patent. No significant stenosis or atherosclerotic disease in the  carotid artery.     Left carotid artery: The left common and internal carotid arteries are  patent. Minimal atherosclerosis of the carotid bifurcation without  significant stenosis.     Vertebral arteries: Vertebral arteries are patent without evidence of  dissection. Dominant right and developmentally smaller left vertebral  arteries. No significant stenosis.     Other findings: Hypodense left thyroid nodule measuring approximately  9-10 mm without definite aggressive features, not necessarily  requiring follow-up by ACR imaging criteria. Small calcification in  the posterior left thyroid lobe. Multilevel degenerative changes of  the cervical spine.      Impression    IMPRESSION:   1. No high-grade stenosis or large vessel occlusion of the major  intracranial arteries.   2. No intracranial aneurysm or high-flow vascular malformation.  3. Patent cervical carotid or vertebral arteries without significant  stenosis. No definite findings of dissection.    JOSE GIBBONS MD         SYSTEM ID:  [id number]   MR Brain w/o & w Contrast    Narrative    EXAM: MR BRAIN W/O and W CONTRAST  LOCATION: [facility]  DATE: 6/24/2024    INDICATION: Resolved aphasia  COMPARISON:  Same day CT head and CTA.  CONTRAST: 7 mL Gadavist  TECHNIQUE: Routine multiplanar multisequence head MRI without and with intravenous contrast.    FINDINGS:  INTRACRANIAL CONTENTS: No acute or subacute infarct. No mass, acute hemorrhage, or extra-axial fluid collections. A couple foci of nonspecific T2/FLAIR hyperintensity within the white matter are of doubtful clinical significance and are within the range   of expected for a patient of this age. Small focus of susceptibility in the left parietal lobe without associated edema or mass effect. Mild generalized cerebral atrophy. No  hydrocephalus. Normal position of the cerebellar tonsils. No pathologic contrast   enhancement.    SELLA: No abnormality accounting for technique.    OSSEOUS STRUCTURES/SOFT TISSUES: Normal marrow signal. The major intracranial vascular flow voids are maintained.     ORBITS: Prior bilateral cataract surgery. Visualized portions of the orbits are otherwise unremarkable.     SINUSES/MASTOIDS: Mild polypoid mucosal thickening scattered about the paranasal sinuses. No middle ear or mastoid effusion.       Impression    IMPRESSION:  1.  Mild age-related changes without acute intracranial abnormality.  2.  Small area of susceptibility in the left parietal lobe without edema, mass effect or enhancement. This likely represents sequela of a remote hemorrhage. Underlying cavernous malformation would be an alternative, however less likely consideration.   Head CT w/o contrast    Narrative    EXAM: CT HEAD W/O CONTRAST  LOCATION: [facility]  DATE: 6/25/2024    INDICATION: Follow-up.  COMPARISON: MRI brain dated 6/24/2024. CT head dated 6/24/2024.  TECHNIQUE: Routine CT Head without IV contrast. Multiplanar reformats. Dose reduction techniques were used.    FINDINGS:  INTRACRANIAL CONTENTS: No intracranial hemorrhage, extraaxial collection, or mass effect.  No CT evidence of acute infarct. Mild presumed chronic small vessel ischemic changes. Mild generalized volume loss. No hydrocephalus.     VISUALIZED ORBITS/SINUSES/MASTOIDS: Prior bilateral cataract surgery. Visualized portions of the orbits are otherwise unremarkable. Mild mucosal thickening scattered about the paranasal sinuses. No middle ear or mastoid effusion.    BONES/SOFT TISSUES: No acute abnormality.      Impression    IMPRESSION:  1.  No CT evidence for acute intracranial process. No finding by CT is identified to correlate with the region of susceptibly artifact involving the left parietal lobe as seen on MRI brain dated 6/24/2024.     2.   Brain atrophy and presumed chronic microvascular ischemic changes as above.   Echocardiogram Complete     Value    LVEF  60-65%    Narrative    [id number]  [id number]  [id number]  [id number]^MERYL SCHMITT^ELISE     [facility]  Echocardiography Laboratory  [facility] [place] [address]     Name: ENRIQUE PANDA  MRN: [id number]  : 1945  Study Date: 2024 12:09 PM  Age: 78 yrs  Gender: Male  Patient Location: [facility]  Reason For Study: TIA  Ordering Physician: ELISE ROWLAND  Referring Physician: Dong Encarnacion  Performed By: Fox Rios RDCS     BSA: 1.9 m2  Height: 68 in  Weight: 160 lb  HR: 60  BP: 130/73 mmHg  ______________________________________________________________________________  Procedure  Complete Portable Echo Adult. Optison (NDC #[id number]) given intravenously.  ______________________________________________________________________________  Interpretation Summary     A cardiac source of embolus was not identified.  Left ventricular systolic function is normal.  The visual ejection fraction is 60-65%.  The left ventricle is normal in size.  There is mild concentric left ventricular hypertrophy.  Sinus rhythm was noted.  Doppler interrogation does not demonstrate signficant stenosis or  insufficiency involving cardiac valves.     No old studies for comparison  ______________________________________________________________________________  Left Ventricle  The left ventricle is normal in size. There is mild concentric left  ventricular hypertrophy. Left ventricular systolic function is normal. The  visual ejection fraction is 60-65%. Grade I or early diastolic dysfunction. No  regional wall motion abnormalities noted. There is no thrombus seen in the  left ventricle.     Right Ventricle  The right ventricle is normal in structure, function and size. There is no  mass or thrombus in the right ventricle.     Atria  Normal left atrial size. Right atrial size is  normal. There is no atrial shunt  seen. The left atrial appendage is not well visualized.     Mitral Valve  The mitral valve leaflets appear normal. There is no evidence of stenosis,  fluttering, or prolapse. There is no mitral regurgitation noted. There is no  mitral valve stenosis.     Tricuspid Valve  Normal tricuspid valve. No tricuspid regurgitation. Right ventricular systolic  pressure could not be approximated due to inadequate tricuspid regurgitation.  There is no tricuspid stenosis.     Aortic Valve  The aortic valve is trileaflet. No aortic regurgitation is present. No aortic  stenosis is present.     Pulmonic Valve  Normal pulmonic valve. There is no pulmonic valvular regurgitation. There is  no pulmonic valvular stenosis.     Vessels  The aortic root is normal size. Normal size ascending aorta. The inferior vena  cava is normal. The pulmonary artery is normal size.     Pericardium  The pericardium appears normal. There is no pleural effusion.     Rhythm  Sinus rhythm was noted.  ______________________________________________________________________________  MMode/2D Measurements & Calculations  IVSd: 1.2 cm     LVIDd: 4.0 cm  LVIDs: 2.6 cm  LVPWd: 1.3 cm  FS: 36.2 %  LV mass(C)d: 172.3 grams  LV mass(C)dI: 92.7 grams/m2  Ao root diam: 3.4 cm  LA dimension: 4.0 cm  asc Aorta Diam: 3.8 cm  LA/Ao: 1.2  LVOT diam: 1.9 cm  LVOT area: 2.9 cm2  Ao root diam index Ht(cm/m): 2.0  Ao root diam index BSA (cm/m2): 1.8  Asc Ao diam index BSA (cm/m2): 2.0  Asc Ao diam index Ht(cm/m): 2.2  RWT: 0.65     Doppler Measurements & Calculations  MV E max joe: 73.8 cm/sec  MV A max joe: 83.7 cm/sec  MV E/A: 0.88  MV dec time: 0.25 sec  E/E' avg: 10.4     Lateral E/e': 9.2  Medial E/e': 11.7  RV S Joe: 11.6 cm/sec     ______________________________________________________________________________  Report approved by: Dr. Froilan Smith 06/25/2024 03:05 PM             Discharge Medications   Current Discharge Medication List         CONTINUE these medications which have NOT CHANGED    Details   apixaban ANTICOAGULANT (ELIQUIS) 5 MG tablet Take 5 mg by mouth 2 times daily Breakfast/evening      !! carbidopa-levodopa (SINEMET CR)  MG CR tablet Take 1.5 tablets by mouth 2 times daily 1 hour prior to breakfast and 1 hour prior to lunch      !! carbidopa-levodopa (SINEMET CR)  MG per tablet Take 1 tablet by mouth 2 times daily 1 hour prior to dinner and at bedtime.      finasteride (PROSCAR) 5 MG tablet Take 5 mg by mouth daily (with breakfast)      folic acid 0.8 MG CAPS Take 0.8 mg by mouth daily (with breakfast)      losartan (COZAAR) 50 MG tablet Take 50 mg by mouth daily (with breakfast)      pantoprazole (PROTONIX) 40 MG EC tablet Take 40 mg by mouth daily 1 hour before breakfast      potassium chloride (K-TAB,KLOR-CON) 10 MEQ tablet Take 30 mEq by mouth 2 times daily Breakfast and evening  Qty: 90 tablet      sotalol (BETAPACE) 80 MG tablet Take 40 mg by mouth every 12 hours Breakfast and evening      triamterene-hydrochlorothiazide (MAXZIDE-25) 37.5-25 MG per tablet Take 1 tablet by mouth daily (with breakfast)       !! - Potential duplicate medications found. Please discuss with provider.        Allergies   Allergies   Allergen Reactions    Metoclopramide     Streptogramins      PN: streptomyacins    Streptomycin Hives    No Clinical Screening - See Comments Anxiety     Other reaction(s): Sedation

## 2024-06-25 NOTE — DISCHARGE INSTRUCTIONS
Your risk factors for stroke or TIA (transient ischemic attack):     Your Risk Factors Your Results Goals   [] High blood pressure BP: 139/68 (06/25/24 1003) Less than 120/80   [] Cholesterol          Total 6/25/2024: 126 mg/dL   Less than 150    Triglycerides   6/25/2024: 97 mg/dL Less than 150    LDL 6/25/2024: 63 mg/dL    Less than 70    HDL 6/25/2024: 44 mg/dL         Greater than 40 (men)  Greater than 50 (women)   [] Diabetes                A1C 6/24/2024: 6.4 % Less than 5.7   [] Atrial fibrillation Atrial fibrillation noted on cardiac monitoring Manage per physician orders   [] Smoking/tobacco use   Tobacco Use      Smoking status: Never      Smokeless tobacco: Never   Quit smoking and tobacco   [] Overweight Body mass index is 24.33 kg/m .  Less than 25     Other risk factors include: carotid (neck) artery disease, other heart diseases, prior stroke or TIA, poor diet, lack of exercise, and excessive alcohol consumption.     [x] Written stroke educational materials given to {provided to whom:978048} including:   {written material:527060}       Know the warning signs and symptoms of stroke: BE FAST     B = Balance loss   E = Eyesight changes   F = Facial droop or numbness   A = Arm or leg weakness   S = Speech difficulty, slurred speech   T = Time to call 911 for help

## 2024-06-25 NOTE — PLAN OF CARE
SLP: Orders received. Chart reviewed and discussed with care team. SLP not indicated due to passing the swallow screen and tolerating a regular diet and thin liquids. He was able to consume 3 oz of water consecutively without immediate or delayed sx of aspiration. Patient has a history of stuttering and currently is followed at the [facility]. Speech/language appears intact during a conversation and at baseline per his wife. Defer discharge recommendations to the medical team. Will complete orders.

## 2024-06-25 NOTE — CONSULTS
"  [facility]    Stroke Telephone Note    I was called by Christina Perales on 06/24/24 regarding patient Kingston Antoine. The patient is a 78 year old male with a history of hypertension, parkinsonism, atrial fibrillation on eliquis had an episode of expressive aphasia and word finding difficulty this morning lasting 30 minutes.  No deficits noted on ED exam    Vitals  BP: (!) 143/73   Pulse: 52   Resp: 18   Temp: 97.6  F (36.4  C)   Weight: 72.6 kg (160 lb)    Imaging Findings  CT head: No CT findings of acute intracranial process   CTA head/neck: No intracranial aneurysm or high-flow vascular malformation   MRI Brain w/w/contrast: Small area of susceptibility in the left parietal lobe without edema, mass effect or enhancement. This likely represents sequela of a remote hemorrhage. Underlying cavernous malformation would be an alternative, however less likely consideration.     Impression  #Possible TIA vs seizure    Recommendations  - CT head w/o contrast in 6 hours and if no bleed then resume Eliquis  - Neurochecks and Vital Signs every q4h   - Statin: after lipid profile  - 24-hour Telemetry  - Bedside Glucose Monitoring  - A1c, Lipid Panel  - PT/OT/SLP  - Stroke Education  - Euthermia, Euglycemia  - EEG in am.    My recommendations are based on the information provided over the phone by Kingston Antoine's in-person providers. They are not intended to replace the clinical judgment of his in-person providers. I was not requested to personally see or examine the patient at this time.     The Stroke Staff is Dr. Pendleton.    Luzma Ritter MD  Vascular Neurology Fellow    To page me or covering stroke neurology team member, click here: AMCOM  Choose \"On Call\" tab at top, then select \"NEUROLOGY/ALL SITES\" from middle drop-down box, press Enter, then look for \"stroke\" or \"telestroke\" for your site.   "

## 2024-06-25 NOTE — PLAN OF CARE
Goal Outcome Evaluation:       Reason for Admission: possible TIAs vs seizures    Cognitive/Mentation: A/Ox 4, forgetful  Neuros/CMS: Intact ex BLUE tremors d/t hx parkinson's,   VS: VSS on RA.   Tele: sinus kristine with 1st degree AV block and bundle branch.  /GI: Continent.   Pulmonary: LS diminished.  Pain: denies.     Drains/Lines: PIV SL  Skin: dry areas, blanchable redness on groin  Activity: Assist x 1 with GB.  Diet: moderate carb with thin liquids. Takes pills whole.     Therapies recs: pending  Discharge: pending    Aggression Stoplight Tool: green    End of shift summary: EEG and TTE with bubble study ordered for today

## 2024-06-25 NOTE — CONSULTS
"[facility]    Stroke Consult Note    Reason for Consult:  Concern for TIA    Chief Complaint: Slurred Speech     HPI  Kingston \"Javier\" DANIA Antoine is a 78 year old male with a PMH of HTN, Parkinsonism, Atrial fibrillation (on Eliquis), hx of bladder cancer, T2DM, GERD, Migraine, known PFO, hx of TIA (2019, dysarthria and ?visual loss that resolved ~10-15 minutes). Kingston presented on 6/24 after an episode of word finding difficulty that lasted ~5-10 minutes.  History obtained from Javier and Saniya at bedside. Saniya reports when she returned home she found Javier eating breakfast and when he attempted to speak to her he was not forming words but rather sounds. She reports that he was able to follows her commands and appeared to be understanding what she said to him. Ronald also adds that he was able to understand what Bon was saying to him and knew what he wanted to say, but was unable to reproduce the correct words. He reports having a similar episode of speech changes in 2019 and at that time was told he had a TIA. In the ED, CTH was negative for hemorrhage. CTA was negative for LVO. MRI brain was negative for acute infarct but revealed an area in the left partial lobe likely reflection prior chronic hemorrhage. Presenting BP was 138/61 and on recheck was 166/84.     Today, Javier denies any recurrence of his transient speech difficulty yesterday.  He denies any new neurologic symptoms. Saniya reports that at baseline Kingston has early signs of dementia and with intermittently have incorrect word substitution.Saniya organizes his medications but he administers them himself. Javier reports that he is typically very good about taking his medications daily, including Eliquis, but cannot confidentially say he hasn't recently missed an Eliquis dose.      Neuro Evaluation Summarized  MRI and/or Head CT MRI Brain: No acute infarct. Small area of susceptibility in the left parietal " lobe without edema, mass effect or enhancement. This likely represents sequela of a remote hemorrhage. Underlying cavernous malformation would be an alternative, however less likely consideration.     Repeat CTH: No CT evidence for acute intracranial process. No finding by CT is identified to correlate with the region of susceptibly artifact involving the left parietal lobe as seen on MRI brain dated 6/24/2024.   CTH: No evidence of hemorrhage.    Intracranial Vasculature CTA Head: No LVO or significant stenosis    Cervical Vasculature CTA Neck: No LVO or significant stenosis      Echocardiogram A cardiac source of embolus was not identified. Left ventricular systolic function is normal. EF 60-65%.  The left ventricle is normal in size. There is mild concentric left ventricular hypertrophy. Normal left and right atria. Sinus rhythm was noted.   EKG/Telemetry Sinus bradycardia with Premature atrial complexes   Incomplete right bundle branch block    Other Testing EEG: This electroencephalogram is abnormal due to the presence of diffuse theta slowing during waking, consistent with mild diffuse nonspecific encephalopathy. No electrographic seizures or epileptiform discharges were recorded. Clinical correlation is advised      LDL No lab value available in past 30 days   A1C 6/24/2024: 6.4 %     ABCD2 Patients Score   Age ? 60 years 1 point 1   Blood Pressure    SBP ? 140 or DBP ?  90    1 point 0   Clinical Features    - Unilateral weakness    - Speech disturbance w/o weakness    - Other    2 points  1 point    0 points 1   Duration of symptoms    ? 60 minutes    10-59 minutes    < 10 minutes   2 points  1 point  0 points 0   Diabetes  1 point 1   Patient s ABCD2 Score (0-7) = 3     Impression  Episode of world finding difficulty, unclear etiology. Differential includes transient ischemic attack vs subclinical seizure which may stem from chronic left parietal lobe hemorrhage vs transient speech changes vs progression  "of underlying parkinsonism and dementia. However, EEG was negative for seizure like activity so will defer initiation of anti-epileptics at this time.     Recommendations   - Neurochecks and vital signs every 4 hours   - SBP goal normotension; Long term goal BP is <130/80 to be achieved as outpatient within several weeks, tighter control associated with improved vascular outcomes  - Resume Eliquis 5 mg twice daily (ordered)   - Reviewed importance of taking twice daily 12 hours apart. Reviewed side effects including easy bleeding and bruising.  - No indiciation for aspirin from a stroke prevention standpoint   - LDL 62 (goal 40-70), will defer statin therapy at this time as LDL is within goal   - Blood glucose monitoring, Hgb A1c 6.4% (goal <7%)  - PT/OT/ST consults as able   - Bedside Glucose Monitoring  - Euthermia, Euglycemia   - Encourage Mediterranean diet and daily exercise  - Reviewed [facility] stroke warning signs     Diagnostic testing  - Continue telemetry while inpatient    Patient Follow-up    - in the next 1-2 week(s) with PCP  - Follow up with your established neurologist in 6-8 weeks     Thank you for this consult. No further stroke evaluation is recommended, so we will sign off. Please contact us with any additional questions.    Neisha Stanley PA-C  Vascular Neurology    To page me or covering stroke neurology team member, click here: [facility]  Choose \"On Call\" tab at top, then select \"NEUROLOGY/ALL SITES\" from middle drop-down box, press Enter, then look for \"stroke\" or \"telestroke\" for your site.  _____________________________________________________    Clinically Significant Risk Factors Present on Admission               # Drug Induced Coagulation Defect: home medication list includes an anticoagulant medication  # Thrombocytopenia: Lowest platelets = 126 in last 2 days, will monitor for bleeding   # Hypertension: Noted on problem list                         Past Medical History    Past Medical " History:   Diagnosis Date    Antiplatelet or antithrombotic long-term use     Bladder cancer (H)     BPH (benign prostatic hyperplasia)     CKD (chronic kidney disease) stage 3, GFR 30-59 ml/min (H)     Diabetes type 2, controlled (H)     GERD (gastroesophageal reflux disease)     With hiatal hernia    HTN (hypertension)     Migraine     Orthostatic hypotension     Parkinson disease (H)     Persistent atrial fibrillation (H)     PFO (patent foramen ovale)     Thrombocytopenia (H24)     TIA (transient ischemic attack) 03/2019     Medications   Home Meds  Prior to Admission medications    Medication Sig Start Date End Date Taking? Authorizing Provider   apixaban ANTICOAGULANT (ELIQUIS) 5 MG tablet Take 5 mg by mouth 2 times daily Breakfast/evening   Yes Reported, Patient   carbidopa-levodopa (SINEMET CR)  MG CR tablet Take 1.5 tablets by mouth 2 times daily 1 hour prior to breakfast and 1 hour prior to lunch   Yes Reported, Patient   carbidopa-levodopa (SINEMET CR)  MG per tablet Take 1 tablet by mouth 2 times daily 1 hour prior to dinner and at bedtime.   Yes Reported, Patient   finasteride (PROSCAR) 5 MG tablet Take 5 mg by mouth daily (with breakfast)   Yes Unknown, Entered By History   folic acid 0.8 MG CAPS Take 0.8 mg by mouth daily (with breakfast)   Yes Unknown, Entered By History   losartan (COZAAR) 50 MG tablet Take 50 mg by mouth daily (with breakfast)   Yes Unknown, Entered By History   pantoprazole (PROTONIX) 40 MG EC tablet Take 40 mg by mouth daily 1 hour before breakfast   Yes Reported, Patient   potassium chloride (K-TAB,KLOR-CON) 10 MEQ tablet Take 30 mEq by mouth 2 times daily Breakfast and evening 6/4/18  Yes Sandra Weston APRN CNP   sotalol (BETAPACE) 80 MG tablet Take 40 mg by mouth every 12 hours Breakfast and evening   Yes Reported, Patient   triamterene-hydrochlorothiazide (MAXZIDE-25) 37.5-25 MG per tablet Take 1 tablet by mouth daily (with breakfast)   Yes Reported,  Patient       Scheduled Meds  Current Facility-Administered Medications   Medication Dose Route Frequency Provider Last Rate Last Admin    apixaban ANTICOAGULANT (ELIQUIS) tablet 5 mg  5 mg Oral BID Neisha Nolan PA-C   5 mg at 06/25/24 1017    carbidopa-levodopa (SINEMET CR)  MG per CR tablet 1 tablet  1 tablet Oral BID Carlton Parra MD   1 tablet at 06/25/24 1016    carbidopa-levodopa (SINEMET CR)  MG per CR tablet 1 tablet  1 tablet Oral 4x Daily Carlton Parra MD   1 tablet at 06/25/24 1016    insulin aspart (NovoLOG) injection (RAPID ACTING)  1-7 Units Subcutaneous TID AC Carlton Parra MD        insulin aspart (NovoLOG) injection (RAPID ACTING)  1-5 Units Subcutaneous At Bedtime Carlton Parra MD        senna-docusate (SENOKOT-S/PERICOLACE) 8.6-50 MG per tablet 1-2 tablet  1-2 tablet Oral or NG Tube BID Carlton Parra MD   1 tablet at 06/25/24 0841    sotalol (BETAPACE) half-tab 40 mg  40 mg Oral Q12H Carlton Parra MD   40 mg at 06/25/24 0841       Infusion Meds  Current Facility-Administered Medications   Medication Dose Route Frequency Provider Last Rate Last Admin       Allergies   Allergies   Allergen Reactions    Metoclopramide     Streptogramins      PN: streptomyacins    Streptomycin Hives    No Clinical Screening - See Comments Anxiety     Other reaction(s): Sedation          PHYSICAL EXAMINATION   Temp:  [97.6  F (36.4  C)-98.4  F (36.9  C)] 98.4  F (36.9  C)  Pulse:  [50-60] 50  Resp:  [18] 18  BP: (130-175)/(59-85) 139/68  SpO2:  [95 %-100 %] 97 %    General Exam  General:  patient lying in bed without any acute distress    HEENT:  normocephalic/atraumatic  Pulmonary:  no respiratory distress    Neuro Exam  Mental Status:  alert, oriented to age, on initial examination not oriented to month but on afternoon exam oriented to month, oriented to current and after ~5 seconds able to name most recent prior president, able to say the  days of the week backwards, able to spell world but unable to spell it backwards (DLOW), follows commands, mild dysarthria, slow speech rate, naming and repetition normal  Cranial Nerves:  visual fields intact, EOMI with normal smooth pursuit, facial sensation intact and symmetric, facial movements symmetric, hearing not formally tested but intact to conversation, tongue protrusion midline  Motor:  bilaterally upper extremities pill rolling tremor able to move all limbs spontaneously, strength symmetric throughout upper extremities, no pronator drift, no leg drift, no leg tremors appericated   Reflexes:  Deferred  Sensory:  light touch sensation intact and symmetric throughout upper and lower extremities, no extinction on double simultaneous stimulation   Coordination:  bilateral upper extremity tremors present with finger-to-nose but without obvious ataxia, normal heel-to-shin bilaterally without dysmetria  Station/Gait:  deferred    Stroke Scales    NIHSS  1a. Level of Consciousness 0-->Alert, keenly responsive   1b. LOC Questions 1-->Answers one question correctly   1c. LOC Commands 0-->Performs both tasks correctly   2.   Best Gaze 0-->Normal   3.   Visual 0-->No visual loss   4.   Facial Palsy 0-->Normal symmetrical movements   5a. Motor Arm, Left 0-->No drift, limb holds 90 (or 45) degrees for full 10 secs   5b. Motor Arm, Right 0-->No drift, limb holds 90 (or 45) degrees for full 10 secs   6a. Motor Leg, Left 0-->No drift, leg holds 30 degree position for full 5 secs   6b. Motor Leg, right 0-->No drift, leg holds 30 degree position for full 5 secs   7.   Limb Ataxia 0-->Absent   8.   Sensory 0-->Normal, no sensory loss   9.   Best Language 0-->No aphasia, normal   10. Dysarthria 1-->Mild-to-moderate dysarthria, patient slurs at least some words and, at worst, can be understood with some difficulty   11. Extinction and Inattention  0-->No abnormality   Total 2 (06/25/24 7296)     Imaging  I personally reviewed  "all imaging; relevant findings per HPI.    Labs Data   CBC  Recent Labs   Lab 06/25/24  0729 06/24/24  1148   WBC 5.5 5.7   RBC 4.75 4.92   HGB 14.2 14.8   HCT 43.4 45.4   * 127*     Basic Metabolic Panel   Recent Labs   Lab 06/25/24  1114 06/25/24  0743 06/25/24  0729 06/25/24  0130 06/24/24  1148   NA  --   --  140  --  145   POTASSIUM  --   --  4.1  --  4.5   CHLORIDE  --   --  104  --  108*   CO2  --   --  27  --  27   BUN  --   --  22.4  --  30.1*   CR  --   --  0.71  --  0.90   * 147* 159*   < > 129*   JOSE  --   --  9.6  --  9.6    < > = values in this interval not displayed.     Liver Panel  No results for input(s): \"PROTTOTAL\", \"ALBUMIN\", \"BILITOTAL\", \"ALKPHOS\", \"AST\", \"ALT\", \"BILIDIRECT\" in the last 168 hours.  INR  No lab results found.        Stroke Consult Data Data   This was a non-emergent, non-telestroke consult.  I have personally spent a total of 60 minutes providing care today, time spent in reviewing medical records and devising the plan as recorded above.   "

## 2024-06-25 NOTE — H&P
[facility]    History and Physical  Hospitalist       Date of Admission:  6/24/2024  Date of Service (when I saw the patient): 06/24/24    ASSESSMENT  Kingston Antoine is a markedly pleasant 78 year old gentleman with past medical history that is most notable for Parkinson's Disease, as well as chronic paroxysmal atrial fibrillation on Eliquis, prior TIA, PFO, and Type 2 Diabetes mellitus, among multiple others; who presents with transient expressive aphasia and is found to have suspected TIA.    PLAN     Possible TIA: Of note, Mr. Antoine has Parkinson's Disease and has suffered from migraine headaches in the past. He suffered a previous TIA in 3/2019, documented in his record has having had sudden onset of transient dysarthria and right visual field defect while vacationing in [place], which lasted for 15 minutes then resolved TTE at that time showed preserved LVEF with a Grade 2 PFO. Subsequent testing in 2019 revealed occult atrial fibrillation. Eliquis was started and has been continued.    Now, he presents for transient expressive aphasia, which resolve after 5 minutes today. In the ED, he is afebrile. He has sinus bradycardia. He has accelerated hypertension without signs of hypertensive crisis. He is not hypoxic. WBC is normal. BMP shows CKD as discussed below. EKG shows PAC's. CT and CTA of head and neck show no acute intracranial processes, no high-grade stenosis or large vessel occlusion of the major  intracranial arteries, and no intracranial aneurysm or high-flow vascular malformation or signs of dissection. MRI of the brain reveals a small area of susceptibility in the left parietal lobe without edema, mass effect or enhancement. Overall, TIA is a possible cause of his symptoms. Seizure could also be in the differential. His symptoms today could also be related alternatively to progressive Parkinson's Disease. The area of abnormality on the left parietal lobe could constitute  anti-coagulation associated ICH, though this seems less likely clinically.       -- Observation. NPO. Neurology consulted. Q 4 neuro checks. TTE with bubble study ordered, as well as lipids, A1c ordered. SP, PT, OT consulted. IV anti-hypertensives as needed as per protocol.    -- Repeat Head CT ordered at 1 AM. Further administration of Eliquis as per results and Neurology evaluation    -- Seizure precautions. EEG ordered.    -- Repeat CBC and BMP in AM. SW consulted for disposition planning.    Chronic paroxysmal atrial fibrillation: Followed by [facility] through [facility]. He has undergone prior DCCV in 9/2019 in [place], [place]. He is on Sotalol as well as Eliquis. QTc is 406 (not prolonged) on EKG today. Prior coronary CT in 2014 had shown mildly elevated calcium score.      -- Resume Sotalol when verified. Resume Eliquis as able.    Hypertension: Resume home Maxzide and Losartan when verified    Chronic thrombocytopenia: Mild. Currently 127. Monitor cosely; repeat CBC this AM.    Parkinson's Disease: Causing autonomic dysfunction and early dementia, as per his most recent outpatient Neurology clinic (the [facility]) note from 5/2024.      -- Fall precautions. Home Sinemet resumed    CKD 3: Noted. Avoid nephrotoxins as able. Repeat BMP in AM.    Recent Labs   Lab Test 06/24/24  1148 11/17/23  1136 06/28/16  1625   CR 0.90 0.96 1.01     History of urothelial cancer: Followed by [facility]; low grade and under surveillance. He also has BPH and is status post prior TURP.      -- Resume home Proscar when verified. Monitor for hematuria while hospitalized.    Type 2 Diabetes mellitus: A1c 7.1 in 2022. Diet controlled as an outpatient.    -- ISS insulin while hospitalized. Hold oral anti-diabetic medications.    GERD with hiatal hernia: Recent EGD as an outpatient 5/24/2024 with biopsies reportedly was negative for malignancy.      -- Resume PPI when verified    I have spent 80 minutes on the date of  service doing chart review, history, examination, documentation, and further activities per the note.    Chief Complaint   Expressive aphasia    History is obtained from the patient, his wife at the bedside, and the ED physician whom I have spoken with    History of Present Illness   Kingston Antoine is a markedly pleasant 78 year old gentleman who presents with acute expressive aphasia. He was with his wife this morning around 10 AM when, in the middle of a conversation, he suddenly could not express the words he wanted to say. This condition lasted for about 5 minutes and then resolved spontaneously. He came in for further evaluation. His wife says that he has been diagnosed with early dementia, related to longstanding Parkinson's Disease. At baseline he is a retired  who is currently reading a book about nuclear catastrophes; but he adds that he now has problems with memory that he did not have previously, such as remembering the name of the president. He has had a problem in his youth with stuttering, and at times due to new dementia and Parkinson's Disease, he will substitute words inappropriately, but he does not normally have trouble with expressive aphasia. He has chronic migraines which always have presented in the past as mild headaches with left sided aura; he has not had one of these in a couple of years now. He has not had any recent cough, fever, or trauma. His wife says that earlier this week he has had intermittent spells  of dizziness; she has been trained in the Epley maneuver and has performed that on him with resolution each time of these spells. He has ongoing tremor from Parkinson's Disease. He otherwise denies any dysuria, hematuria, or any other acute complaints.    In the ED,   06/24 1126 138/61 97.6  F (36.4  C) 54 18 99 %     CBC and BMP were notable for , Cl 108, BUN 30.1, Cr 0.90, Glucose 129,  otherwise were within the normal reference range. EKG showed sinus  bradycardia with PAC's.    The case was discussed with Stroke Neurology in the ED. He was given 500 ml IVF.    Recent Results (from the past 24 hour(s))   CT Head w/o Contrast    Narrative    CT SCAN OF THE HEAD WITHOUT CONTRAST June 24, 2024 1:10 PM     HISTORY: Episode of aphasia, resolved.    TECHNIQUE: Axial images of the head and coronal reformations without  IV contrast material. Radiation dose for this scan was reduced using  automated exposure control, adjustment of the mA and/or kV according  to patient size, or iterative reconstruction technique.    COMPARISON: MRI of the brain dated 5/21/2018.    FINDINGS: There is no evidence of intracranial hemorrhage, mass, acute  infarct or anomaly. The ventricles are normal in size, shape and  configuration. Mild diffuse parenchymal volume loss. Mild patchy  periventricular white matter hypodensities which are nonspecific, but  likely related to chronic microvascular ischemic disease.     Bilateral lens implants. Multiple polypoid lesions in the bilateral  maxillary and left sphenoid sinuses, nonspecific, potentially  representing retention cysts or polyps. Lobulated calcified or  ossified lesion in the left frontal sinus may represent an osteoma.  The mastoid and middle ear cavities appear grossly clear. Right  temporomandibular joint degenerative changes. The bony calvarium and  bones of the skull base appear intact.       Impression    IMPRESSION:   1. No CT findings of acute intracranial process.  2. Mild presumed age-related changes of the brain, as described.    JOSE GIBBONS MD         SYSTEM ID:  [id number]   CTA Head Neck with Contrast    Narrative    CT ANGIOGRAM OF THE HEAD AND NECK WITH CONTRAST  6/24/2024 1:11 PM     HISTORY: Episode of aphasia, resolved.    TECHNIQUE:  CT angiography with an injection of 67 mL Isovue 370 IV  with scans through the head and neck. Images were transferred to a  separate 3-D workstation where multiplanar reformations and 3-D  images  were created. Estimates of carotid stenoses are made relative to the  distal internal carotid artery diameters except as noted. Radiation  dose for this scan was reduced using automated exposure control,  adjustment of the mA and/or kV according to patient size, or iterative  reconstruction technique.    COMPARISON: CT head same day.     CT ANGIOGRAM HEAD FINDINGS: There is atherosclerosis involving the  bilateral carotid siphons, without flow-limiting stenosis. The major  proximal branches of the anterior cerebral and middle cerebral  arteries appear patent. Dominant right and developmentally smaller  left vertebral arteries. The left vertebral artery terminates in the  left posterior inferior cerebral artery, a normal variant. The basilar  artery appears patent. The proximal branches of the posterior cerebral  arteries are patent.    CT ANGIOGRAM NECK FINDINGS: Normal origin of the great vessels from  the aortic arch.     Right carotid artery: The right common and internal carotid arteries  are patent. No significant stenosis or atherosclerotic disease in the  carotid artery.     Left carotid artery: The left common and internal carotid arteries are  patent. Minimal atherosclerosis of the carotid bifurcation without  significant stenosis.     Vertebral arteries: Vertebral arteries are patent without evidence of  dissection. Dominant right and developmentally smaller left vertebral  arteries. No significant stenosis.     Other findings: Hypodense left thyroid nodule measuring approximately  9-10 mm without definite aggressive features, not necessarily  requiring follow-up by ACR imaging criteria. Small calcification in  the posterior left thyroid lobe. Multilevel degenerative changes of  the cervical spine.      Impression    IMPRESSION:   1. No high-grade stenosis or large vessel occlusion of the major  intracranial arteries.   2. No intracranial aneurysm or high-flow vascular malformation.  3. Patent  cervical carotid or vertebral arteries without significant  stenosis. No definite findings of dissection.    JOSE GIBBONS MD         SYSTEM ID:  [id number]   MR Brain w/o & w Contrast    Narrative    EXAM: MR BRAIN W/O and W CONTRAST  LOCATION: [facility]  DATE: 6/24/2024    INDICATION: Resolved aphasia  COMPARISON:  Same day CT head and CTA.  CONTRAST: 7 mL Gadavist  TECHNIQUE: Routine multiplanar multisequence head MRI without and with intravenous contrast.    FINDINGS:  INTRACRANIAL CONTENTS: No acute or subacute infarct. No mass, acute hemorrhage, or extra-axial fluid collections. A couple foci of nonspecific T2/FLAIR hyperintensity within the white matter are of doubtful clinical significance and are within the range   of expected for a patient of this age. Small focus of susceptibility in the left parietal lobe without associated edema or mass effect. Mild generalized cerebral atrophy. No hydrocephalus. Normal position of the cerebellar tonsils. No pathologic contrast   enhancement.    SELLA: No abnormality accounting for technique.    OSSEOUS STRUCTURES/SOFT TISSUES: Normal marrow signal. The major intracranial vascular flow voids are maintained.     ORBITS: Prior bilateral cataract surgery. Visualized portions of the orbits are otherwise unremarkable.     SINUSES/MASTOIDS: Mild polypoid mucosal thickening scattered about the paranasal sinuses. No middle ear or mastoid effusion.       Impression    IMPRESSION:  1.  Mild age-related changes without acute intracranial abnormality.  2.  Small area of susceptibility in the left parietal lobe without edema, mass effect or enhancement. This likely represents sequela of a remote hemorrhage. Underlying cavernous malformation would be an alternative, however less likely consideration.       PHYSICAL EXAM  Blood pressure (!) 143/73, pulse 52, temperature 97.6  F (36.4  C), temperature source Temporal, resp. rate 18, weight 72.6 kg (160 lb),  SpO2 100%.  Constitutional: Alert and oriented to person, place and time; needs re-direction during the interview; no apparent distress  Respiratory: lungs clear to auscultation bilaterally  Cardiovascular: regular S1 S2  GI: abdomen soft non tender non distended bowel sounds positive  Musculoskeletal: no clubbing, cyanosis or edema  Neurologic: extra-ocular muscles intact; moves all four extremities; severe resting tremor     DVT Prophylaxis: DOAC  Code Status: Full Code; discussed and confirmed     Disposition: Expected discharge in 0-2 days    Carlton Parra MD, MD    Past Medical History    I have reviewed this patient's medical history and updated it with pertinent information if needed.   Past Medical History:   Diagnosis Date    Antiplatelet or antithrombotic long-term use     Bladder cancer (H)     BPH (benign prostatic hyperplasia)     CKD (chronic kidney disease) stage 3, GFR 30-59 ml/min (H)     Diabetes type 2, controlled (H)     GERD (gastroesophageal reflux disease)     With hiatal hernia    HTN (hypertension)     Migraine     Orthostatic hypotension     Parkinson disease (H)     Persistent atrial fibrillation (H)     PFO (patent foramen ovale)     Thrombocytopenia (H24)     TIA (transient ischemic attack) 03/2019       Past Surgical History   I have reviewed this patient's surgical history and updated it with pertinent information if needed.  Past Surgical History:   Procedure Laterality Date    ------------OTHER-------------      anal fistula repair    ARTHROSCOPY KNEE      cholecystectomy      COLONOSCOPY      CYSTOSCOPY  01/25/2024    EGD  05/24/2024    With resection of polyps    EXCISE LESION EYELID Right 06/28/2018    Procedure: EXCISE LESION EYELID;  RIGHT LOWER LID WEDGE RESECTION WITH FROZEN SECTION CONTROL AND RECONSTRUCTION;  Surgeon: Delroy Catherine MD;  Location: [facility]    GI SURGERY      EGD    HERNIA REPAIR      TONSILLECTOMY & ADENOIDECTOMY      TUNA      TURP  2007    WEDGE  RESECTION EYELID Left 04/14/2022    Procedure: LEFT LOWER LID WEDGE WITH FROZEN SECTION CONTROL;  Surgeon: Delroy Catherine MD;  Location: [facility]       Prior to Admission Medications   Prior to Admission Medications   Prescriptions Last Dose Informant Patient Reported? Taking?   apixaban ANTICOAGULANT (ELIQUIS) 5 MG tablet   Yes No   Sig: Take 5 mg by mouth 2 times daily   carbidopa-levodopa (SINEMET CR)  MG CR tablet   Yes No   Sig: Take 1.5 tablets by mouth 2 times daily   carbidopa-levodopa (SINEMET CR)  MG per tablet   Yes No   Sig: Take 2 tablets by mouth At Bedtime   erythromycin (ROMYCIN) 5 MG/GM ophthalmic ointment   No No   Sig: Apply small amount to incision sites three times daily, then apply to inner lower lid of operative eye(s) at bedtime, as directed.   fa-pyridoxine-cyancobalamin 2.5-25-2 MG TABS per tablet   Yes No   Sig: Take 2 tablets by mouth daily   pantoprazole (PROTONIX) 40 MG EC tablet   Yes No   Sig: Take 40 mg by mouth daily   potassium chloride (K-TAB,KLOR-CON) 10 MEQ tablet   Yes No   Sig: Take 3 tablets (30 mEq) by mouth 2 times daily   sildenafil (VIAGRA) 100 MG tablet   Yes No   Sig: Take 100 mg by mouth as needed   sotalol (BETAPACE) 80 MG tablet   Yes No   Sig: Take 40 mg by mouth every 12 hours   triamcinolone (KENALOG) 0.5 % cream   Yes No   Sig: Topical 2 x daily a thin layer to affected areas   triamterene-hydrochlorothiazide (MAXZIDE-25) 37.5-25 MG per tablet   Yes No   Sig: Take 1 tablet by mouth daily   zinc 50 MG TABS   Yes No   Sig: Take by mouth daily      Facility-Administered Medications: None     Allergies   Allergies   Allergen Reactions    Metoclopramide     Streptogramins      PN: streptomyacins    Streptomycin Hives    No Clinical Screening - See Comments Anxiety     Other reaction(s): Sedation       Social History   I have reviewed this patient's social history and updated it with pertinent information if needed. Kingston Antoine  reports that he has  never smoked. He has never used smokeless tobacco. He reports current alcohol use. He reports that he does not use drugs.    Family History   Family history assessed and, except as above, is non-contributory.    Father had multiple TIA's/mini-strokes in the last years of his life    Review of Systems   The 10 point Review of Systems is negative other than noted in the HPI or here.     Primary Care Physician   Dong Encarnacion    Data   Labs Ordered and Resulted from Time of ED Arrival to Time of ED Departure   BASIC METABOLIC PANEL - Abnormal       Result Value    Sodium 145      Potassium 4.5      Chloride 108 (*)     Carbon Dioxide (CO2) 27      Anion Gap 10      Urea Nitrogen 30.1 (*)     Creatinine 0.90      GFR Estimate 86      Calcium 9.6      Glucose 129 (*)    CBC WITH PLATELETS AND DIFFERENTIAL - Abnormal    WBC Count 5.7      RBC Count 4.92      Hemoglobin 14.8      Hematocrit 45.4      MCV 92      MCH 30.1      MCHC 32.6      RDW 12.7      Platelet Count 127 (*)     % Neutrophils 55      % Lymphocytes 36      % Monocytes 7      % Eosinophils 1      % Basophils 0      % Immature Granulocytes 0      NRBCs per 100 WBC 0      Absolute Neutrophils 3.1      Absolute Lymphocytes 2.1      Absolute Monocytes 0.4      Absolute Eosinophils 0.1      Absolute Basophils 0.0      Absolute Immature Granulocytes 0.0      Absolute NRBCs 0.0         Data reviewed today:  I personally reviewed the EKG tracing showing sinus bradycardia with PAC's and the brain MRI image(s) showing left parietal lesion .

## 2024-06-25 NOTE — PLAN OF CARE
Goal Outcome Evaluation:    Pt discharging via POV with S/O. All belongings with patient. Discharge instructions provided and questions encouraged. Denies pain. IV discontinued. Pt left in stable condition.

## 2024-06-25 NOTE — PHARMACY-ADMISSION MEDICATION HISTORY
Pharmacist Admission Medication History    Admission medication history is complete. The information provided in this note is only as accurate as the sources available at the time of the update.    Information Source(s): Family member and CareEverywhere/SureScripts via in-person    Changes made to PTA medication list:  Added: finasteride, losartan, folic acid 800mcg daily  Deleted: folic acid/pyridoxine/cyanocobalamin, triamcinolone, zinc  Changed: frequency of carbidopa/levodopa 50/200ER changed from 2 at bedtime to 1 at dinner & 1 at bedtime (1.5 tab BID entry left the same)    Medication History Completed By: Joy Traylor, PharmD 6/24/2024 9:38 PM    PTA Med List   Medication Sig Last Dose    apixaban ANTICOAGULANT (ELIQUIS) 5 MG tablet Take 5 mg by mouth 2 times daily Breakfast/evening 6/24/2024 at AM x 1 dose    carbidopa-levodopa (SINEMET CR)  MG CR tablet Take 1.5 tablets by mouth 2 times daily 1 hour prior to breakfast and 1 hour prior to lunch 6/24/2024 at x 2 doses    carbidopa-levodopa (SINEMET CR)  MG per tablet Take 1 tablet by mouth 2 times daily 1 hour prior to dinner and at bedtime. 6/24/2024 at pm x 1 dose    finasteride (PROSCAR) 5 MG tablet Take 5 mg by mouth daily (with breakfast) 6/24/2024 at AM    folic acid 0.8 MG CAPS Take 0.8 mg by mouth daily (with breakfast) 6/24/2024 at AM    losartan (COZAAR) 50 MG tablet Take 50 mg by mouth daily (with breakfast) 6/24/2024 at AM    pantoprazole (PROTONIX) 40 MG EC tablet Take 40 mg by mouth daily 1 hour before breakfast 6/24/2024 at AM    potassium chloride (K-TAB,KLOR-CON) 10 MEQ tablet Take 30 mEq by mouth 2 times daily Breakfast and evening 6/24/2024 at am x 1 dose    sotalol (BETAPACE) 80 MG tablet Take 40 mg by mouth every 12 hours Breakfast and evening 6/24/2024 at am x 1 dose    triamterene-hydrochlorothiazide (MAXZIDE-25) 37.5-25 MG per tablet Take 1 tablet by mouth daily (with breakfast) 6/24/2024 at AM

## 2024-06-25 NOTE — ED NOTES
[facility]  ED Nurse Handoff Report    ED Chief complaint: Slurred Speech      ED Diagnosis:   Final diagnoses:   Speech disturbance, unspecified type       Code Status: confirm w/ hospitalist    Allergies:   Allergies   Allergen Reactions    Metoclopramide     Streptogramins      PN: streptomyacins    Streptomycin Hives    No Clinical Screening - See Comments Anxiety     Other reaction(s): Sedation       Patient Story: Pt w/ history of Parkinson's, dementia, TIA. Per wife his speech was more slurred than baseline @ 1030, lasted about 15 mins and then resolved.   Focused Assessment:  Neuro @ baseline per wife, /73, HR 50s, SpO2 100% on room air.     Treatments and/or interventions provided:   MR Brain w/o & w Contrast   Final Result   IMPRESSION:   1.  Mild age-related changes without acute intracranial abnormality.   2.  Small area of susceptibility in the left parietal lobe without edema, mass effect or enhancement. This likely represents sequela of a remote hemorrhage. Underlying cavernous malformation would be an alternative, however less likely consideration.      CTA Head Neck with Contrast   Final Result   IMPRESSION:    1. No high-grade stenosis or large vessel occlusion of the major   intracranial arteries.    2. No intracranial aneurysm or high-flow vascular malformation.   3. Patent cervical carotid or vertebral arteries without significant   stenosis. No definite findings of dissection.      JOSE GIBBONS MD            SYSTEM ID:  [id number]      CT Head w/o Contrast   Final Result   IMPRESSION:    1. No CT findings of acute intracranial process.   2. Mild presumed age-related changes of the brain, as described.      JOSE GIBBONS MD            SYSTEM ID:  [id number]      Head CT w/o contrast    (Results Pending)      Labs Ordered and Resulted from Time of ED Arrival to Time of ED Departure   BASIC METABOLIC PANEL - Abnormal       Result Value    Sodium 145      Potassium 4.5       Chloride 108 (*)     Carbon Dioxide (CO2) 27      Anion Gap 10      Urea Nitrogen 30.1 (*)     Creatinine 0.90      GFR Estimate 86      Calcium 9.6      Glucose 129 (*)    CBC WITH PLATELETS AND DIFFERENTIAL - Abnormal    WBC Count 5.7      RBC Count 4.92      Hemoglobin 14.8      Hematocrit 45.4      MCV 92      MCH 30.1      MCHC 32.6      RDW 12.7      Platelet Count 127 (*)     % Neutrophils 55      % Lymphocytes 36      % Monocytes 7      % Eosinophils 1      % Basophils 0      % Immature Granulocytes 0      NRBCs per 100 WBC 0      Absolute Neutrophils 3.1      Absolute Lymphocytes 2.1      Absolute Monocytes 0.4      Absolute Eosinophils 0.1      Absolute Basophils 0.0      Absolute Immature Granulocytes 0.0      Absolute NRBCs 0.0        Patient's response to treatments and/or interventions: stable    To be done/followed up on inpatient unit:  continue to monitor    Does this patient have any cognitive concerns?: Forgetful    Activity level - Baseline/Home:  Stand with Assist  Activity Level - Current:   Stand with Assist    Patient's Preferred language: English   Needed?: No    Isolation: None  Infection: Not Applicable  Patient tested for COVID 19 prior to admission: NO  Bariatric?: No    Vital Signs:   Vitals:    06/24/24 1126 06/24/24 1400 06/24/24 1515 06/24/24 1800   BP: 138/61 (!) 166/84 (!) 161/78 (!) 143/73   Pulse: 54 50  52   Resp: 18      Temp: 97.6  F (36.4  C)      TempSrc: Temporal      SpO2: 99% 99% 100%    Weight: 72.6 kg (160 lb)          Cardiac Rhythm:     Was the PSS-3 completed:   Yes  What interventions are required if any?               Family Comments: wife @ bedside, primary caretaker  OBS brochure/video discussed/provided to patient/family: Yes              Name of person given brochure if not patient:               Relationship to patient:     For the majority of the shift this patient's behavior was Green.   Behavioral interventions performed were rounding.    ED  NURSE PHONE NUMBER: [phone]

## 2024-10-20 ENCOUNTER — HEALTH MAINTENANCE LETTER (OUTPATIENT)
Age: 79
End: 2024-10-20

## 2024-11-18 ENCOUNTER — APPOINTMENT (OUTPATIENT)
Dept: CT IMAGING | Facility: CLINIC | Age: 79
End: 2024-11-18
Attending: EMERGENCY MEDICINE
Payer: MEDICARE

## 2024-11-18 ENCOUNTER — HOSPITAL ENCOUNTER (EMERGENCY)
Facility: CLINIC | Age: 79
Discharge: HOME | End: 2024-11-18
Attending: EMERGENCY MEDICINE | Admitting: EMERGENCY MEDICINE
Payer: MEDICARE

## 2024-11-18 ENCOUNTER — APPOINTMENT (OUTPATIENT)
Dept: MRI IMAGING | Facility: CLINIC | Age: 79
End: 2024-11-18
Attending: EMERGENCY MEDICINE
Payer: MEDICARE

## 2024-11-18 VITALS
SYSTOLIC BLOOD PRESSURE: 162 MMHG | TEMPERATURE: 97.9 F | DIASTOLIC BLOOD PRESSURE: 102 MMHG | HEART RATE: 50 BPM | RESPIRATION RATE: 18 BRPM | OXYGEN SATURATION: 97 %

## 2024-11-18 DIAGNOSIS — H53.9 VISION CHANGES: ICD-10-CM

## 2024-11-18 DIAGNOSIS — R03.0 ELEVATED BLOOD PRESSURE READING: ICD-10-CM

## 2024-11-18 LAB
ANION GAP SERPL CALCULATED.3IONS-SCNC: 5 MMOL/L (ref 7–15)
BASOPHILS # BLD AUTO: 0 10E3/UL (ref 0–0.2)
BASOPHILS NFR BLD AUTO: 0 %
BUN SERPL-MCNC: 26.6 MG/DL (ref 8–23)
CALCIUM SERPL-MCNC: 9.6 MG/DL (ref 8.8–10.4)
CHLORIDE SERPL-SCNC: 105 MMOL/L (ref 98–107)
CREAT SERPL-MCNC: 0.73 MG/DL (ref 0.67–1.17)
EGFRCR SERPLBLD CKD-EPI 2021: >90 ML/MIN/1.73M2
EOSINOPHIL # BLD AUTO: 0 10E3/UL (ref 0–0.7)
EOSINOPHIL NFR BLD AUTO: 1 %
ERYTHROCYTE [DISTWIDTH] IN BLOOD BY AUTOMATED COUNT: 12.5 % (ref 10–15)
GLUCOSE SERPL-MCNC: 204 MG/DL (ref 70–99)
HCO3 SERPL-SCNC: 31 MMOL/L (ref 22–29)
HCT VFR BLD AUTO: 42.7 % (ref 40–53)
HGB BLD-MCNC: 13.7 G/DL (ref 13.3–17.7)
IMM GRANULOCYTES # BLD: 0 10E3/UL
IMM GRANULOCYTES NFR BLD: 0 %
LYMPHOCYTES # BLD AUTO: 1.6 10E3/UL (ref 0.8–5.3)
LYMPHOCYTES NFR BLD AUTO: 34 %
MCH RBC QN AUTO: 29.3 PG (ref 26.5–33)
MCHC RBC AUTO-ENTMCNC: 32.1 G/DL (ref 31.5–36.5)
MCV RBC AUTO: 91 FL (ref 78–100)
MONOCYTES # BLD AUTO: 0.3 10E3/UL (ref 0–1.3)
MONOCYTES NFR BLD AUTO: 7 %
NEUTROPHILS # BLD AUTO: 2.7 10E3/UL (ref 1.6–8.3)
NEUTROPHILS NFR BLD AUTO: 58 %
NRBC # BLD AUTO: 0 10E3/UL
NRBC BLD AUTO-RTO: 0 /100
PLATELET # BLD AUTO: 117 10E3/UL (ref 150–450)
POTASSIUM SERPL-SCNC: 4.2 MMOL/L (ref 3.4–5.3)
RBC # BLD AUTO: 4.67 10E6/UL (ref 4.4–5.9)
SODIUM SERPL-SCNC: 141 MMOL/L (ref 135–145)
WBC # BLD AUTO: 4.7 10E3/UL (ref 4–11)

## 2024-11-18 PROCEDURE — 85014 HEMATOCRIT: CPT | Performed by: EMERGENCY MEDICINE

## 2024-11-18 PROCEDURE — 80048 BASIC METABOLIC PNL TOTAL CA: CPT | Performed by: EMERGENCY MEDICINE

## 2024-11-18 PROCEDURE — 250N000009 HC RX 250: Performed by: EMERGENCY MEDICINE

## 2024-11-18 PROCEDURE — 85004 AUTOMATED DIFF WBC COUNT: CPT | Performed by: EMERGENCY MEDICINE

## 2024-11-18 PROCEDURE — 250N000011 HC RX IP 250 OP 636: Performed by: EMERGENCY MEDICINE

## 2024-11-18 PROCEDURE — 70551 MRI BRAIN STEM W/O DYE: CPT | Mod: MG

## 2024-11-18 PROCEDURE — 70450 CT HEAD/BRAIN W/O DYE: CPT | Mod: MA

## 2024-11-18 PROCEDURE — 70496 CT ANGIOGRAPHY HEAD: CPT | Mod: MA

## 2024-11-18 PROCEDURE — 99285 EMERGENCY DEPT VISIT HI MDM: CPT | Mod: 25

## 2024-11-18 PROCEDURE — G1010 CDSM STANSON: HCPCS

## 2024-11-18 PROCEDURE — 36415 COLL VENOUS BLD VENIPUNCTURE: CPT | Performed by: EMERGENCY MEDICINE

## 2024-11-18 PROCEDURE — 82310 ASSAY OF CALCIUM: CPT | Performed by: EMERGENCY MEDICINE

## 2024-11-18 RX ORDER — IOPAMIDOL 755 MG/ML
67 INJECTION, SOLUTION INTRAVASCULAR ONCE
Status: COMPLETED | OUTPATIENT
Start: 2024-11-18 | End: 2024-11-18

## 2024-11-18 RX ORDER — GADOBUTROL 604.72 MG/ML
8.5 INJECTION INTRAVENOUS ONCE
Status: DISCONTINUED | OUTPATIENT
Start: 2024-11-18 | End: 2024-11-18

## 2024-11-18 RX ADMIN — IOPAMIDOL 67 ML: 755 INJECTION, SOLUTION INTRAVENOUS at 09:55

## 2024-11-18 RX ADMIN — SODIUM CHLORIDE 100 ML: 9 INJECTION, SOLUTION INTRAVENOUS at 09:56

## 2024-11-18 ASSESSMENT — ACTIVITIES OF DAILY LIVING (ADL)
ADLS_ACUITY_SCORE: 0

## 2024-11-18 NOTE — DISCHARGE INSTRUCTIONS
Please call your eye doctor and schedule an appointment. If your vision changes and you lose vision, you have a curtain of vision loss or you are seeing flashing lights across your vision, the [facility] Emergency Department does have ophthalmology on-call and can see you.    Please also keep a log of your blood pressures to discuss with your nephrologist as well as your primary care physician.    Please return to the emergency department if you have chest pain, shortness of breath, slurred speech, facial droop, numbness or weakness on one side of your body.

## 2024-11-18 NOTE — ED TRIAGE NOTES
Pt presents with HTN for past few days. PT reports BP was in 200s this morning, recheck was 130s at home. Bp 140s in ED. Pt has long standing hx of HTN. Pt also reports floaters in left eye.      Triage Assessment (Adult)       Row Name 11/18/24 0836          Triage Assessment    Airway WDL WDL        Respiratory WDL    Respiratory WDL WDL        Cardiac WDL    Cardiac WDL --  HTN        Cognitive/Neuro/Behavioral WDL    Cognitive/Neuro/Behavioral WDL WDL

## 2024-11-18 NOTE — ED PROVIDER NOTES
Emergency Department Note      History of Present Illness     Chief Complaint   Hypertension      HPI   Kingston Antoine is a 78 year old male with history of atrial fibrillation on Eliquis, type 2 diabetes mellitus, transient ischemic attack, hypertension, and bladder cancer who presents to the ED accompanied by his wife for hypertension. The patient reports that he was at work yesterday when he suddenly experienced 30 seconds of small black spots and small rings in his left eye. The patient's wife adds that the patient had a spike in blood pressure that morning which decreased over the course of the day. Then this morning, the patient woke up to systolic blood pressure of 203. He rechecked his blood pressure later and found that it went down like yesterday. He and his wife called the patient's primary, Dr. Encarnacion, and reports his symptoms. The clinic nurse instructed the patient to be seen in the ED to rule out stroke. While staying in the ED the patient isn't experiencing his previously mentioned vision changes, but he does complain seeing daquan waves in his left eye when he looks down. Denies loss of vision, double vision, numbness or weakness in any extremities, and recent falls. The patient's A1c level is noted to be up to 7.1 from his normal level of 6.5.    Independent Historian   Wife as detailed above.    Review of External Notes   Reviewed patient's office visit on 10/14/2024, patient was seen for hypertension, Parkinson's, diabetes.    Past Medical History     Medical History and Problem List   Paroxysmal atrial fibrillation  Orthostatic hypotension  Chronic kidney disease  Migraine  Hypertension  Type 2 diabetes mellitus   Chronic sinusitis   Parkinson's disease  Transient ischemic attack  GERD  Thrombocytopenia   Nonalcoholic fatty liver disease  IBS  Lung nodule  Adenomatous colon polyps  Tobacco abuse  Vitamin D deficiency  Arthritis   Sleep apnea  Malignant neoplasm of bladder    Medications    Losartan  Eliquis  Proscar  Betapace  Dyazide  Protonix  Sinemet  Donepezil     Surgical History   Umbilical herniorrhaphy   Knee arthroscopy  Cholecystectomy  Tonsillectomy  Adenoidectomy  Polysomnography  Skin biopsy  Sigmoidoscopy   TURP  Shoulder arthroscopy  EGD  Cystoscopy   TUNA  Cystoscopy     Physical Exam     Patient Vitals for the past 24 hrs:   BP Temp Temp src Pulse Resp SpO2   11/18/24 1327 (!) 162/102 97.9  F (36.6  C) Oral -- -- 97 %   11/18/24 1100 (!) 173/72 -- -- -- -- --   11/18/24 0945 (!) 145/71 -- -- 50 -- 96 %   11/18/24 0930 (!) 149/76 -- -- 51 -- 98 %   11/18/24 0915 (!) 150/89 -- -- 54 -- 97 %   11/18/24 0845 (!) 135/94 -- -- 60 -- 99 %   11/18/24 0841 (!) 157/78 -- -- 61 -- 99 %   11/18/24 0838 -- -- -- -- 18 99 %   11/18/24 0833 (!) 140/66 -- -- 70 -- --     Physical Exam  Gen: No distress.  Nontoxic.  Head: Atraumatic.  No hematomas, lesions, abrasions  Neck: No midline tenderness of the spine.  Mouth: No oral lesions, or abrasions.  Mucous membranes are moist.  Resp: Equal breath sounds, normal respiratory effort  Cardio: Regular rate and rhythm, no murmurs  Abdomen: Soft, nontender, nondistended  MSK; no extremity swelling, bruising, or abrasions.  Neuro: Cranial nerves II through XII intact.  5 out of 5 muscle strength in the upper and lower extremities.  Sensation intact in the upper and lower extremities.  Finger-to-nose negative.  Heel-to-shin negative.  No truncal ataxia.  Psych: Appropriate affect.        Diagnostics     Lab Results   Labs Ordered and Resulted from Time of ED Arrival to Time of ED Departure   BASIC METABOLIC PANEL - Abnormal       Result Value    Sodium 141      Potassium 4.2      Chloride 105      Carbon Dioxide (CO2) 31 (*)     Anion Gap 5 (*)     Urea Nitrogen 26.6 (*)     Creatinine 0.73      GFR Estimate >90      Calcium 9.6      Glucose 204 (*)    CBC WITH PLATELETS AND DIFFERENTIAL - Abnormal    WBC Count 4.7      RBC Count 4.67      Hemoglobin 13.7       Hematocrit 42.7      MCV 91      MCH 29.3      MCHC 32.1      RDW 12.5      Platelet Count 117 (*)     % Neutrophils 58      % Lymphocytes 34      % Monocytes 7      % Eosinophils 1      % Basophils 0      % Immature Granulocytes 0      NRBCs per 100 WBC 0      Absolute Neutrophils 2.7      Absolute Lymphocytes 1.6      Absolute Monocytes 0.3      Absolute Eosinophils 0.0      Absolute Basophils 0.0      Absolute Immature Granulocytes 0.0      Absolute NRBCs 0.0         Imaging   MR Brain w/o Contrast   Final Result   IMPRESSION: Negative for acute intracranial hemorrhage, infarct,   hydrocephalus or mass.      SCOT ROSE DO            SYSTEM ID:  [id number]      CT Head w/o Contrast   Final Result   IMPRESSION: Negative for acute intracranial hemorrhage, hydrocephalus   or transcortical infarct. No skull fracture.      SCOT ROSE DO            SYSTEM ID:  [id number]      CTA Head Neck with Contrast   Final Result   IMPRESSION: No large vessel arterial occlusion or high-grade stenosis   in the head or neck. No arterial dissection.      SCOT ROSE DO            SYSTEM ID:  [id number]          Independent Interpretation   None    ED Course      Medications Administered   Medications   iopamidol (ISOVUE-370) solution 67 mL (67 mLs Intravenous $Given 11/18/24 0955)   sodium chloride 0.9 % bag 500mL for CT scan flush use (100 mLs Intravenous $Given 11/18/24 0956)       Procedures   Procedures     Discussion of Management   None    ED Course   ED Course as of 11/18/24 1537   Mon Nov 18, 2024   0900 I obtained history and performed physical exam as noted above.        Additional Documentation  None    Medical Decision Making / Diagnosis     CMS Diagnoses: None    MIPS       None    MDM   Kingston Antoine is a 78 year old male with history of A-fib on Eliquis, diabetes, hypertension presents to the emergency department with a complaint of a spike in his blood pressure yesterday morning and this morning.  Patient  reports that he took his blood pressure and it was very elevated with a systolic over 200 and waited 15 minutes, his blood pressure then normalized.  Patient reports that for the past 2 days he has had some black spots in his vision.  He is currently asymptomatic.  Patient has not had any facial droop, slurred speech, numbness or weakness on one side of his body.  He is not having any chest pain or shortness of breath.  On exam, patient is well-appearing.  No focal neurologic signs.  His vision is intact, he does not have any vision loss.  He is not seeing any spots in his vision right now.  No double vision.  I did discuss a broad differential with the patient including stroke, retinal detachment, vitreous hemorrhage, vision changes secondary to diabetes.  At this point the patient would like a TIA workup.  His last TIA his symptoms were difficulty with speech.  He is not having that today.  Will get a CT and CTA, and then an MRI.  All imaging of the patient's brain comes back without any acute findings.  I have low suspicion for stroke.  Patient's vision is at his baseline, and he is not having any vision loss, black spots, or any vision changes at this time.  I did offer him transfer to the [facility] emergency department for ophthalmology evaluation.  The patient declines.  He states that he will follow-up outpatient with his eye doctor.  Patient states that he is ready to go home, he will follow-up with his primary care physician as well as his eye doctor.  Patient is discharged home.    Disposition   The patient was discharged.     Diagnosis     ICD-10-CM    1. Elevated blood pressure reading  R03.0       2. Vision changes  H53.9            Discharge Medications   Discharge Medication List as of 11/18/2024  1:20 PM            Scribe Disclosure:  Summer BOYD, am serving as a scribe at 9:23 AM on 11/18/2024 to document services personally performed by Amber Luz MD based on my  observations and the provider's statements to me.        Amber Luz MD  11/18/24 1234

## 2024-12-15 ENCOUNTER — HOSPITAL ENCOUNTER (EMERGENCY)
Facility: CLINIC | Age: 79
Discharge: HOME | End: 2024-12-15
Attending: EMERGENCY MEDICINE | Admitting: EMERGENCY MEDICINE
Payer: MEDICARE

## 2024-12-15 ENCOUNTER — APPOINTMENT (OUTPATIENT)
Dept: GENERAL RADIOLOGY | Facility: CLINIC | Age: 79
End: 2024-12-15
Attending: EMERGENCY MEDICINE
Payer: MEDICARE

## 2024-12-15 VITALS
OXYGEN SATURATION: 97 % | HEART RATE: 67 BPM | DIASTOLIC BLOOD PRESSURE: 53 MMHG | HEIGHT: 67 IN | WEIGHT: 149 LBS | BODY MASS INDEX: 23.39 KG/M2 | RESPIRATION RATE: 16 BRPM | TEMPERATURE: 97 F | SYSTOLIC BLOOD PRESSURE: 100 MMHG

## 2024-12-15 DIAGNOSIS — R07.81 RIB PAIN ON LEFT SIDE: ICD-10-CM

## 2024-12-15 LAB
ANION GAP SERPL CALCULATED.3IONS-SCNC: 9 MMOL/L (ref 7–15)
BASOPHILS # BLD AUTO: 0 10E3/UL (ref 0–0.2)
BASOPHILS NFR BLD AUTO: 0 %
BUN SERPL-MCNC: 30.7 MG/DL (ref 8–23)
CALCIUM SERPL-MCNC: 9.4 MG/DL (ref 8.8–10.4)
CHLORIDE SERPL-SCNC: 103 MMOL/L (ref 98–107)
CREAT SERPL-MCNC: 0.84 MG/DL (ref 0.67–1.17)
D DIMER PPP FEU-MCNC: 0.43 UG/ML FEU (ref 0–0.5)
EGFRCR SERPLBLD CKD-EPI 2021: 88 ML/MIN/1.73M2
EOSINOPHIL # BLD AUTO: 0.1 10E3/UL (ref 0–0.7)
EOSINOPHIL NFR BLD AUTO: 1 %
ERYTHROCYTE [DISTWIDTH] IN BLOOD BY AUTOMATED COUNT: 12.1 % (ref 10–15)
GLUCOSE SERPL-MCNC: 150 MG/DL (ref 70–99)
HCO3 SERPL-SCNC: 27 MMOL/L (ref 22–29)
HCT VFR BLD AUTO: 43.4 % (ref 40–53)
HGB BLD-MCNC: 14.2 G/DL (ref 13.3–17.7)
IMM GRANULOCYTES # BLD: 0 10E3/UL
IMM GRANULOCYTES NFR BLD: 0 %
LYMPHOCYTES # BLD AUTO: 1.2 10E3/UL (ref 0.8–5.3)
LYMPHOCYTES NFR BLD AUTO: 17 %
MCH RBC QN AUTO: 29.9 PG (ref 26.5–33)
MCHC RBC AUTO-ENTMCNC: 32.7 G/DL (ref 31.5–36.5)
MCV RBC AUTO: 91 FL (ref 78–100)
MONOCYTES # BLD AUTO: 0.4 10E3/UL (ref 0–1.3)
MONOCYTES NFR BLD AUTO: 5 %
NEUTROPHILS # BLD AUTO: 5.5 10E3/UL (ref 1.6–8.3)
NEUTROPHILS NFR BLD AUTO: 77 %
NRBC # BLD AUTO: 0 10E3/UL
NRBC BLD AUTO-RTO: 0 /100
PLATELET # BLD AUTO: 116 10E3/UL (ref 150–450)
POTASSIUM SERPL-SCNC: 4.6 MMOL/L (ref 3.4–5.3)
RBC # BLD AUTO: 4.75 10E6/UL (ref 4.4–5.9)
SODIUM SERPL-SCNC: 139 MMOL/L (ref 135–145)
TROPONIN T SERPL HS-MCNC: 14 NG/L
TROPONIN T SERPL HS-MCNC: 16 NG/L
WBC # BLD AUTO: 7.2 10E3/UL (ref 4–11)

## 2024-12-15 PROCEDURE — 99285 EMERGENCY DEPT VISIT HI MDM: CPT | Mod: 25

## 2024-12-15 PROCEDURE — 93005 ELECTROCARDIOGRAM TRACING: CPT

## 2024-12-15 PROCEDURE — 36415 COLL VENOUS BLD VENIPUNCTURE: CPT | Performed by: EMERGENCY MEDICINE

## 2024-12-15 PROCEDURE — 84484 ASSAY OF TROPONIN QUANT: CPT | Performed by: EMERGENCY MEDICINE

## 2024-12-15 PROCEDURE — 71046 X-RAY EXAM CHEST 2 VIEWS: CPT

## 2024-12-15 PROCEDURE — 85379 FIBRIN DEGRADATION QUANT: CPT | Performed by: EMERGENCY MEDICINE

## 2024-12-15 PROCEDURE — 80048 BASIC METABOLIC PNL TOTAL CA: CPT | Performed by: EMERGENCY MEDICINE

## 2024-12-15 PROCEDURE — 85025 COMPLETE CBC W/AUTO DIFF WBC: CPT | Performed by: EMERGENCY MEDICINE

## 2024-12-15 ASSESSMENT — ACTIVITIES OF DAILY LIVING (ADL)
ADLS_ACUITY_SCORE: 44

## 2024-12-15 ASSESSMENT — COLUMBIA-SUICIDE SEVERITY RATING SCALE - C-SSRS
2. HAVE YOU ACTUALLY HAD ANY THOUGHTS OF KILLING YOURSELF IN THE PAST MONTH?: NO
1. IN THE PAST MONTH, HAVE YOU WISHED YOU WERE DEAD OR WISHED YOU COULD GO TO SLEEP AND NOT WAKE UP?: NO
6. HAVE YOU EVER DONE ANYTHING, STARTED TO DO ANYTHING, OR PREPARED TO DO ANYTHING TO END YOUR LIFE?: NO

## 2024-12-15 NOTE — ED PROVIDER NOTES
"  Emergency Department Note      History of Present Illness     Chief Complaint   Rib Pain      HPI   Kingston Antoine is a 79 year old male with a history of A-fib on Eliquis, type 2 diabetes, TIA, hypertension, bladder cancer who presents with a left lower rib pain.  Last night it started and it was gnawing and constant today it is better more intermittent and lasts for a couple seconds.  He denies any injury stress or strain.  He does do exercises for Parkinson using low 3 pound weights has not increased repetitions.  No fever cough or recent travel.  He is not really had this before.  He was seen in the emergency department a couple weeks ago for hypertension and followed up with his nephrologist who started him on amlodipine and now his blood pressures are much improved. No recent travel      Independent Historian   None    Review of /External Notes   Note from 11/18/24 patient was seen for hypertension in the emergency department with Dr. Luz    Past Medical History     Medical History and Problem List   Atrial septal defect  BPH  Carcinoma in situ of bladder  Hypertension  AFIB  Fatty liver  Thrombocytopenia  Vertigo  Type 2 diabetes  CKD  GERD  Hiatal hernia  Hypotension  Paralysis agitans  Parkinson's disease  Anomia  Hyperlipidemia  Dysphonia  Gait disorder  Migraine  Chronic sinusitis  Patent foramen ovale    Medications   Norvasc  Aricept  Proscar  Cozaar  Dyazide  Eliquis  Sinemet  Betapace    Surgical History   Anal fistula repair  Cholecystectomy  Knee arthroscopy  Colonoscopy  Cystoscopy  Combined EGD  Excise lesion eyelid (R)  Hernia repair  Tonsillectomy & adenoidectomy  Wedge resection eyelid (L)  TURP    Physical Exam     Patient Vitals for the past 24 hrs:   BP Temp Temp src Pulse Resp SpO2 Height Weight   12/15/24 1130 106/64 -- -- 58 -- -- -- --   12/15/24 0850 (!) 150/79 97  F (36.1  C) Temporal 59 16 97 % 1.702 m (5' 7\") 67.6 kg (149 lb)     Physical Exam    Physical Exam "   Constitutional:  Patient is oriented to person, place, and time. They appear well-developed and well-nourished.    HENT:   Mouth/Throat:   Oropharynx is clear and moist.   Eyes:    Conjunctivae normal and EOM are normal. Pupils are equal, round, and reactive to light.   Neck:    Normal range of motion.   Cardiovascular: Normal rate, regular rhythm and normal heart sounds.  Exam reveals no gallop and no friction rub.  No murmur heard.  No pleuritic pain.  Somewhat reproducible to palpation on the left lower lateral rib margin no evidence of shingles ecchymosis or crepitus  Pulmonary/Chest:  Effort normal and breath sounds normal. Patient has no wheezes. Patient has no rales.   Abdominal:   Soft. Bowel sounds are normal. Patient exhibits no mass. There is no tenderness. There is no rebound and no guarding.   Musculoskeletal:  Normal range of motion. Patient exhibits no edema.   Neurological:   Patient is alert and oriented to person, place, and time. Patient has normal strength. No cranial nerve deficit or sensory deficit. GCS 15  Skin:   Skin is warm and dry. No rash noted. No erythema.   Psychiatric:   Patient has a normal mood and affect. Patient's behavior is normal. Judgment and thought content normal.       Diagnostics     Lab Results   Labs Ordered and Resulted from Time of ED Arrival to Time of ED Departure   BASIC METABOLIC PANEL - Abnormal       Result Value    Sodium 139      Potassium 4.6      Chloride 103      Carbon Dioxide (CO2) 27      Anion Gap 9      Urea Nitrogen 30.7 (*)     Creatinine 0.84      GFR Estimate 88      Calcium 9.4      Glucose 150 (*)    CBC WITH PLATELETS AND DIFFERENTIAL - Abnormal    WBC Count 7.2      RBC Count 4.75      Hemoglobin 14.2      Hematocrit 43.4      MCV 91      MCH 29.9      MCHC 32.7      RDW 12.1      Platelet Count 116 (*)     % Neutrophils 77      % Lymphocytes 17      % Monocytes 5      % Eosinophils 1      % Basophils 0      % Immature Granulocytes 0       NRBCs per 100 WBC 0      Absolute Neutrophils 5.5      Absolute Lymphocytes 1.2      Absolute Monocytes 0.4      Absolute Eosinophils 0.1      Absolute Basophils 0.0      Absolute Immature Granulocytes 0.0      Absolute NRBCs 0.0     TROPONIN T, HIGH SENSITIVITY - Normal    Troponin T, High Sensitivity 16     TROPONIN T, HIGH SENSITIVITY - Normal    Troponin T, High Sensitivity 14     D DIMER QUANTITATIVE - Normal    D-Dimer Quantitative 0.43         Imaging   XR Chest 2 Views   Final Result   IMPRESSION:       Mildly elevated left hemidiaphragm with adjacent left basilar atelectasis. Right lung is clear. No pleural effusions or pneumothorax.      Nonenlarged cardiac silhouette. Loop recorder device overlies the left chest wall.          EKG   ECG taken at 0936, ECG read at 0952  Sinus rhythm  Right bundle branch block  Abnormal ECG   Rate 54 bpm. MO interval 168 ms. QRS duration 120 ms. QT/QTc 440/417 ms. P-R-T axes -7 -2 30.    Independent Interpretation   None    ED Course      Medications Administered   Medications - No data to display      Discussion of Management   None    ED Course   ED Course as of 12/15/24 1223   Sun Dec 15, 2024   0902 I obtained history and examined the patient as noted above   1058 I rechecked the patient and explained findings.   1213 I rechecked the patient again.       Additional Documentation  None    Medical Decision Making / Diagnosis     CMS Diagnoses: None    MIPS       None    MDM   Kingston Antoine is a 79 year old male who presents to the emergency department with left lower rib pain no clear injury.  There is no crepitus no ecchymosis or shingles.  ECG was performed which does not show any acute injury pattern.  Basic blood work was obtained including delta troponins he has no acute metabolic derangements he is not anemic his delta troponins are detectable but normal.  A D-dimer is also normal.  Chest x-ray does not show any acute infiltrate effusion pneumothorax mass.  He  does have some left basilar atelectasis which I did discuss with him.  At this time given his reassuring workup I believe his pain is musculoskeletal being that it is reproducible to palpation.  I feel he is safe for discharge symptomatic cares were discussed.    Disposition   The patient was discharged.     Diagnosis     ICD-10-CM    1. Rib pain on left side  R07.81                Scribe Disclosure:  I, Nathaniel Cleaning, am serving as a scribe at 9:42 AM on 12/15/2024 to document services personally performed by Niecy Harley MD based on my observations and the provider's statements to me.        Niecy Harley MD  12/15/24 2621

## 2024-12-15 NOTE — ED NOTES
MD notified of low blood pressure. Patient is asymptomatic. Patient given some crackers and jelly and water. Will monitor.

## 2024-12-16 LAB
ATRIAL RATE - MUSE: 54 BPM
DIASTOLIC BLOOD PRESSURE - MUSE: NORMAL MMHG
INTERPRETATION ECG - MUSE: NORMAL
P AXIS - MUSE: 6 DEGREES
PR INTERVAL - MUSE: 158 MS
QRS DURATION - MUSE: 124 MS
QT - MUSE: 446 MS
QTC - MUSE: 422 MS
R AXIS - MUSE: -2 DEGREES
SYSTOLIC BLOOD PRESSURE - MUSE: NORMAL MMHG
T AXIS - MUSE: 37 DEGREES
VENTRICULAR RATE- MUSE: 54 BPM

## 2025-01-26 ENCOUNTER — HEALTH MAINTENANCE LETTER (OUTPATIENT)
Age: 80
End: 2025-01-26

## 2025-02-21 ENCOUNTER — HOSPITAL ENCOUNTER (OUTPATIENT)
Dept: ULTRASOUND IMAGING | Facility: CLINIC | Age: 80
Discharge: HOME | End: 2025-02-21
Attending: UROLOGY | Admitting: UROLOGY
Payer: MEDICARE

## 2025-02-21 DIAGNOSIS — N28.89 OTHER SPECIFIED DISORDERS OF KIDNEY AND URETER: ICD-10-CM

## 2025-02-21 PROCEDURE — 76770 US EXAM ABDO BACK WALL COMP: CPT

## 2025-03-31 ENCOUNTER — ANCILLARY PROCEDURE (OUTPATIENT)
Dept: CT IMAGING | Facility: CLINIC | Age: 80
End: 2025-03-31
Attending: UROLOGY
Payer: MEDICARE

## 2025-03-31 DIAGNOSIS — N28.89 OTHER SPECIFIED DISORDERS OF KIDNEY AND URETER: ICD-10-CM

## 2025-03-31 LAB
CREAT BLD-MCNC: 1.2 MG/DL (ref 0.7–1.2)
EGFRCR SERPLBLD CKD-EPI 2021: >60 ML/MIN/1.73M2

## 2025-03-31 PROCEDURE — 74178 CT ABD&PLV WO CNTR FLWD CNTR: CPT

## 2025-03-31 PROCEDURE — 250N000011 HC RX IP 250 OP 636: Performed by: UROLOGY

## 2025-03-31 PROCEDURE — 82565 ASSAY OF CREATININE: CPT

## 2025-03-31 RX ORDER — IOPAMIDOL 755 MG/ML
81 INJECTION, SOLUTION INTRAVASCULAR ONCE
Status: COMPLETED | OUTPATIENT
Start: 2025-03-31 | End: 2025-03-31

## 2025-03-31 RX ADMIN — IOPAMIDOL 81 ML: 755 INJECTION, SOLUTION INTRAVENOUS at 16:03

## 2025-05-02 NOTE — PROGRESS NOTES
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     The natural history of pancreas cancer was discussed with the patient and accompanying family members and/or other guests. We had a very lengthy conversation about expected survival and recurrence rates with multiple approaches.     Regarding his candidacy for surgery, he is certainly 'borderline' at best. Surgery is associated with the longest survival but I worry that Doug surgical risks far exceed the normal patient. Diabetes, Afib, PFO, history of TIA, and ?history of kidney disease are all significant medical comorbidities, and his Parkinson's adds to a great deal of uncertainty about his recovery but in general will prolong his recovery, with the potential to not recover to his present baseline. He did ask about upfront surgery; I do not recommend surgery upfront for pancreas cancer patients unless there are unusual absolute contraindications to chemotherapy. He is certainly high risk for complications from chemo as well, but chemo is much less invasive. I used the ACS-NSQIP risk calculator, and provided an image of the results, which expected about a 4% risk of mortality and a 33% chance of non-home discharge, both of which are 10x the usual patient risk profile.    Importantly, I am worried about the inflammatory appearance of his tumor and the presence of lymphadenopathy (although small) extending down the abdomen a bit. A CA 19-9 will be done today and could help with  decision-making.    All of this discussion does not address the unknown risk of the complex pancreatic head mass, which may have a mural nodule. If we want to consider surgery eventually, I would probably have an EUS redone here at [facility] to see if that is positive. If it is, Kingston is not a candidate for a total pancreatectomy so we would need to consider palliative options only.    In the end, I think Kingston should meet with Radiation Oncology and Palliative Care, and then meet with Dr. Restrepo to consider all options. I am not saying 'no' to surgery now but he is very high risk. I would start with chemo first, but do think that palliative chemo followed by chemoRT is probably a best option for him to balance quality and quantity of life.    All questions were answered to their apparent satisfaction, and contact information was provided should additional questions or concerns arise.    Plan Summary:  -Borderline candidate for distal pancreatectomy and splenectomy  -Labs today including CA 19-9  -Will ask Dr. Restrepo (outside Epic message sent; will also call his office) to coordinate (1) Rad Onc and (2) Palliative Care visits, and to see the patient back  -We will check in in a few weeks to see if the patient wishes to proceed with curative intent; if so, we should repeat his EUS to characterize the possibility of a mural nodule in the head of the pancreas. If positive, patient is not a candidate for a total pancreatectomy. If negative, we would see the patient back after 2mo of chemo (assuming Norvell Abraxane)    Stiven Juarez MD MPHS        Medical Decision Making    Today's visit was centered around a new cancer diagnosis in the setting of additional medical comorbidities. The risk of additional morbidity or mortality with or without further treatment is high. Total time spent was 90 minutes, including but not limited to patient-facing time, chart review, review of tests/studies as noted above, and care  "coordination.             History of Present Illness     Pertinent history obtained from: chart review and patient    Kingston was getting a CT for renal mass surveillance in March, which noted a new pancreas mass.    EUS was done on 4/24/25 at , Dr. Otero. He identified a cyst in the neck of the pancreas, 3.6cm in size, with a mural nodule. Another mass was seen in the pancreatic tail measuring 2.7cm.    Cyst fluid analysis showed a CEA of 118, and cytology was negative for any cellular content. FNA of the tail lesion was positive for malignancy.     CT was done on 4/28 and this showed the tail mass and the cystic lesion, which was described more as in the head. There is also a stable left renal mass suspicious for malignancy.    Past Medical History, Past Surgical History, and Family History reviewed - see appropriate tabs in EMR    Physical Exam     Vital signs:  /75 (BP Location: Right arm, Patient Position: Sitting, Cuff Size: Adult Regular)   Pulse 52   Temp 97.6  F (36.4  C) (Oral)   Resp 16   Ht 1.702 m (5' 7\")   Wt 68.6 kg (151 lb 3.2 oz)   SpO2 99%   BMI 23.68 kg/m          General: NAD, alert, oriented  HEENT: no scleral icterus, EOMI  Pulm: non-labored breathing, equal excursion bilaterally  Extremities: warm, no edema  Skin: no apparent rashes or lesions  Neuro: baseline tremor, some cognitive delay but clear/coherent/able to consent; Parkinsonian motor delay    Data   Laboratory data and imaging listed below were reviewed as part of this encounter.     Imaging:  CT from March and 4/28/25 reviewed    Outside Records:  Reviewed extensively, see HPI             "

## 2025-05-03 ENCOUNTER — HEALTH MAINTENANCE LETTER (OUTPATIENT)
Age: 80
End: 2025-05-03

## 2025-05-05 ENCOUNTER — LAB (OUTPATIENT)
Dept: LAB | Facility: CLINIC | Age: 80
End: 2025-05-05
Payer: MEDICARE

## 2025-05-05 ENCOUNTER — ONCOLOGY VISIT (OUTPATIENT)
Dept: SURGERY | Facility: CLINIC | Age: 80
End: 2025-05-05
Attending: INTERNAL MEDICINE
Payer: MEDICARE

## 2025-05-05 VITALS
HEIGHT: 67 IN | RESPIRATION RATE: 16 BRPM | HEART RATE: 52 BPM | SYSTOLIC BLOOD PRESSURE: 128 MMHG | BODY MASS INDEX: 23.73 KG/M2 | OXYGEN SATURATION: 99 % | WEIGHT: 151.2 LBS | TEMPERATURE: 97.6 F | DIASTOLIC BLOOD PRESSURE: 75 MMHG

## 2025-05-05 DIAGNOSIS — C25.2 MALIGNANT NEOPLASM OF TAIL OF PANCREAS (H): Primary | ICD-10-CM

## 2025-05-05 DIAGNOSIS — C25.2 MALIGNANT NEOPLASM OF TAIL OF PANCREAS (H): ICD-10-CM

## 2025-05-05 LAB
ALBUMIN SERPL BCG-MCNC: 4.1 G/DL (ref 3.5–5.2)
ALP SERPL-CCNC: 83 U/L (ref 40–150)
ALT SERPL W P-5'-P-CCNC: 8 U/L (ref 0–70)
ANION GAP SERPL CALCULATED.3IONS-SCNC: 8 MMOL/L (ref 7–15)
AST SERPL W P-5'-P-CCNC: 48 U/L (ref 0–45)
BASOPHILS # BLD AUTO: 0 10E3/UL (ref 0–0.2)
BASOPHILS NFR BLD AUTO: 0 %
BILIRUB SERPL-MCNC: 0.4 MG/DL
BUN SERPL-MCNC: 30.9 MG/DL (ref 8–23)
CALCIUM SERPL-MCNC: 9.8 MG/DL (ref 8.8–10.4)
CHLORIDE SERPL-SCNC: 104 MMOL/L (ref 98–107)
CREAT SERPL-MCNC: 0.88 MG/DL (ref 0.67–1.17)
EGFRCR SERPLBLD CKD-EPI 2021: 86 ML/MIN/1.73M2
EOSINOPHIL # BLD AUTO: 0.1 10E3/UL (ref 0–0.7)
EOSINOPHIL NFR BLD AUTO: 1 %
ERYTHROCYTE [DISTWIDTH] IN BLOOD BY AUTOMATED COUNT: 11.9 % (ref 10–15)
GLUCOSE SERPL-MCNC: 121 MG/DL (ref 70–99)
HCO3 SERPL-SCNC: 29 MMOL/L (ref 22–29)
HCT VFR BLD AUTO: 43.8 % (ref 40–53)
HGB BLD-MCNC: 14.1 G/DL (ref 13.3–17.7)
IMM GRANULOCYTES # BLD: 0 10E3/UL
IMM GRANULOCYTES NFR BLD: 0 %
LYMPHOCYTES # BLD AUTO: 1.5 10E3/UL (ref 0.8–5.3)
LYMPHOCYTES NFR BLD AUTO: 28 %
MCH RBC QN AUTO: 29 PG (ref 26.5–33)
MCHC RBC AUTO-ENTMCNC: 32.2 G/DL (ref 31.5–36.5)
MCV RBC AUTO: 90 FL (ref 78–100)
MONOCYTES # BLD AUTO: 0.4 10E3/UL (ref 0–1.3)
MONOCYTES NFR BLD AUTO: 7 %
NEUTROPHILS # BLD AUTO: 3.4 10E3/UL (ref 1.6–8.3)
NEUTROPHILS NFR BLD AUTO: 64 %
NRBC # BLD AUTO: 0 10E3/UL
NRBC BLD AUTO-RTO: 0 /100
PLATELET # BLD AUTO: 141 10E3/UL (ref 150–450)
POTASSIUM SERPL-SCNC: 4.6 MMOL/L (ref 3.4–5.3)
PREALB SERPL-MCNC: 20.4 MG/DL (ref 20–40)
PROT SERPL-MCNC: 7 G/DL (ref 6.4–8.3)
RBC # BLD AUTO: 4.86 10E6/UL (ref 4.4–5.9)
SODIUM SERPL-SCNC: 141 MMOL/L (ref 135–145)
WBC # BLD AUTO: 5.4 10E3/UL (ref 4–11)

## 2025-05-05 PROCEDURE — 99205 OFFICE O/P NEW HI 60 MIN: CPT | Performed by: SURGERY

## 2025-05-05 PROCEDURE — 84134 ASSAY OF PREALBUMIN: CPT | Performed by: SURGERY

## 2025-05-05 PROCEDURE — 36415 COLL VENOUS BLD VENIPUNCTURE: CPT | Performed by: PATHOLOGY

## 2025-05-05 PROCEDURE — 80053 COMPREHEN METABOLIC PANEL: CPT | Performed by: PATHOLOGY

## 2025-05-05 PROCEDURE — 99417 PROLNG OP E/M EACH 15 MIN: CPT | Performed by: SURGERY

## 2025-05-05 PROCEDURE — G0463 HOSPITAL OUTPT CLINIC VISIT: HCPCS | Performed by: SURGERY

## 2025-05-05 PROCEDURE — 86301 IMMUNOASSAY TUMOR CA 19-9: CPT | Mod: 90 | Performed by: PATHOLOGY

## 2025-05-05 PROCEDURE — 99000 SPECIMEN HANDLING OFFICE-LAB: CPT | Performed by: PATHOLOGY

## 2025-05-05 PROCEDURE — 85025 COMPLETE CBC W/AUTO DIFF WBC: CPT | Performed by: PATHOLOGY

## 2025-05-05 RX ORDER — AMLODIPINE BESYLATE 5 MG/1
5 TABLET ORAL AT BEDTIME
COMMUNITY

## 2025-05-05 ASSESSMENT — PAIN SCALES - GENERAL: PAINLEVEL_OUTOF10: NO PAIN (0)

## 2025-05-05 NOTE — NURSING NOTE
"Oncology Rooming Note    May 5, 2025 10:22 AM   Kingston Antoine is a 79 year old male who presents for:    Chief Complaint   Patient presents with    Oncology Clinic Visit     Pancreatic Cancer     Initial Vitals: /75 (BP Location: Right arm, Patient Position: Sitting, Cuff Size: Adult Regular)   Pulse 52   Temp 97.6  F (36.4  C) (Oral)   Resp 16   Ht 1.702 m (5' 7\")   Wt 68.6 kg (151 lb 3.2 oz)   SpO2 99%   BMI 23.68 kg/m   Estimated body mass index is 23.68 kg/m  as calculated from the following:    Height as of this encounter: 1.702 m (5' 7\").    Weight as of this encounter: 68.6 kg (151 lb 3.2 oz). Body surface area is 1.8 meters squared.  No Pain (0) Comment: Data Unavailable   No LMP for male patient.  Allergies reviewed: Yes  Medications reviewed: Yes    Medications: Medication refills not needed today.  Pharmacy name entered into Purch:    [id number] - [place], [place] - [address]  [id number] - [place], [place] - [address]    Frailty Screening:   Is the patient here for a new oncology consult visit in cancer care? 2. No    PHQ9:  Did this patient require a PHQ9?: Yes   If the patient required a PHQ9 assessment, did the results require a follow up with the Provider/Nurse?: No      Clinical concerns: Patient is new.       Marilee Basurto LPN  5/5/2025              "

## 2025-05-05 NOTE — LETTER
5/5/2025      Kingston Antoine  [address] [facility] [address]      Dear Colleague,    Thank you for referring your patient, Kingston Antoine, to the [facility]. Please see a copy of my visit note below.      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     The natural history of pancreas cancer was discussed with the patient and accompanying family members and/or other guests. We had a very lengthy conversation about expected survival and recurrence rates with multiple approaches.     Regarding his candidacy for surgery, he is certainly 'borderline' at best. Surgery is associated with the longest survival but I worry that Doug surgical risks far exceed the normal patient. Diabetes, Afib, PFO, history of TIA, and ?history of kidney disease are all significant medical comorbidities, and his Parkinson's adds to a great deal of uncertainty about his recovery but in general will prolong his recovery, with the potential to not recover to his present baseline. He did ask about upfront surgery; I do not recommend surgery upfront for pancreas cancer patients unless there are unusual absolute contraindications to chemotherapy. He is certainly high risk for complications from chemo as well, but chemo is much less invasive. I used the ACS-NSQIP risk calculator, and provided an image of the results, which expected about a 4% risk of mortality and a 33% chance of non-home discharge, both of which are 10x the  usual patient risk profile.    Importantly, I am worried about the inflammatory appearance of his tumor and the presence of lymphadenopathy (although small) extending down the abdomen a bit. A CA 19-9 will be done today and could help with decision-making.    All of this discussion does not address the unknown risk of the complex pancreatic head mass, which may have a mural nodule. If we want to consider surgery eventually, I would probably have an EUS redone here at the  to see if that is positive. If it is, Kingston is not a candidate for a total pancreatectomy so we would need to consider palliative options only.    In the end, I think Kingston should meet with Radiation Oncology and Palliative Care, and then meet with Dr. Restrepo to consider all options. I am not saying 'no' to surgery now but he is very high risk. I would start with chemo first, but do think that palliative chemo followed by chemoRT is probably a best option for him to balance quality and quantity of life.    All questions were answered to their apparent satisfaction, and contact information was provided should additional questions or concerns arise.    Plan Summary:  -Borderline candidate for distal pancreatectomy and splenectomy  -Labs today including CA 19-9  -Will ask Dr. Restrepo (outside Epic message sent; will also call his office) to coordinate (1) Rad Onc and (2) Palliative Care visits, and to see the patient back  -We will check in in a few weeks to see if the patient wishes to proceed with curative intent; if so, we should repeat his EUS to characterize the possibility of a mural nodule in the head of the pancreas. If positive, patient is not a candidate for a total pancreatectomy. If negative, we would see the patient back after 2mo of chemo (assuming [place] Abraxane)    Stiven Juarez MD MPHS        Medical Decision Making   Today's visit was centered around a new cancer diagnosis in the setting of additional medical comorbidities. The  "risk of additional morbidity or mortality with or without further treatment is high. Total time spent was 90 minutes, including but not limited to patient-facing time, chart review, review of tests/studies as noted above, and care coordination.             History of Present Illness    Pertinent history obtained from: chart review and patient    Kingston was getting a CT for renal mass surveillance in March, which noted a new pancreas mass.    EUS was done on 4/24/25 at , Dr. Otero. He identified a cyst in the neck of the pancreas, 3.6cm in size, with a mural nodule. Another mass was seen in the pancreatic tail measuring 2.7cm.    Cyst fluid analysis showed a CEA of 118, and cytology was negative for any cellular content. FNA of the tail lesion was positive for malignancy.     CT was done on 4/28 and this showed the tail mass and the cystic lesion, which was described more as in the head. There is also a stable left renal mass suspicious for malignancy.    Past Medical History, Past Surgical History, and Family History reviewed - see appropriate tabs in EMR    Physical Exam    Vital signs:  /75 (BP Location: Right arm, Patient Position: Sitting, Cuff Size: Adult Regular)   Pulse 52   Temp 97.6  F (36.4  C) (Oral)   Resp 16   Ht 1.702 m (5' 7\")   Wt 68.6 kg (151 lb 3.2 oz)   SpO2 99%   BMI 23.68 kg/m          General: NAD, alert, oriented  HEENT: no scleral icterus, EOMI  Pulm: non-labored breathing, equal excursion bilaterally  Extremities: warm, no edema  Skin: no apparent rashes or lesions  Neuro: baseline tremor, some cognitive delay but clear/coherent/able to consent; Parkinsonian motor delay    Data  Laboratory data and imaging listed below were reviewed as part of this encounter.     Imaging:  CT from March and 4/28/25 reviewed    Outside Records:  Reviewed extensively, see HPI                 Again, thank you for allowing me to participate in the care of your patient.  "       Sincerely,        Stiven Juarez MD    Electronically signed

## 2025-05-07 ENCOUNTER — RESULTS FOLLOW-UP (OUTPATIENT)
Dept: SURGERY | Facility: CLINIC | Age: 80
End: 2025-05-07

## 2025-05-07 LAB — CANCER AG19-9 SERPL IA-ACNC: 340 U/ML

## 2025-06-14 ENCOUNTER — HEALTH MAINTENANCE LETTER (OUTPATIENT)
Age: 80
End: 2025-06-14

## 2025-08-16 ENCOUNTER — HEALTH MAINTENANCE LETTER (OUTPATIENT)
Age: 80
End: 2025-08-16

## (undated) DEVICE — PACK OCULOPLATIC SEN15OCFSD

## (undated) DEVICE — ESU NDL COLORADO MICRO 3CM STR N103A

## (undated) DEVICE — SU VICRYL 6-0 S-29DA 18" J555G

## (undated) DEVICE — SOL WATER IRRIG 1000ML BOTTLE 2F7114

## (undated) DEVICE — SU VICRYL 6-0 P-3 18" UND J492H

## (undated) DEVICE — SOL NACL 0.9% IRRIG 1000ML BOTTLE 2F7124

## (undated) DEVICE — LINEN TOWEL PACK X5 5464

## (undated) DEVICE — ESU ELEC BLADE NN-STCK PLUMEPEN PRO 360D 10FT PLPRO4020

## (undated) DEVICE — SU VICRYL 5-0 S-14DA 18" J571G

## (undated) DEVICE — SU PLAIN 6-0 G-1DA 18" 770G

## (undated) DEVICE — GLOVE PROTEXIS W/NEU-THERA 7.5  2D73TE75

## (undated) DEVICE — SOL NACL 0.9% IRRIG 1000ML BOTTLE 07138-09

## (undated) DEVICE — EYE PREP BETADINE 5% SOLUTION 30ML 0065-0411-30

## (undated) DEVICE — GLOVE PROTEXIS POWDER FREE SMT 7.5  2D72PT75X

## (undated) DEVICE — ESU PENCIL W/HOLSTER

## (undated) RX ORDER — ONDANSETRON 2 MG/ML
INJECTION INTRAMUSCULAR; INTRAVENOUS
Status: DISPENSED
Start: 2022-04-14

## (undated) RX ORDER — LIDOCAINE HYDROCHLORIDE 20 MG/ML
INJECTION, SOLUTION EPIDURAL; INFILTRATION; INTRACAUDAL; PERINEURAL
Status: DISPENSED
Start: 2022-04-14

## (undated) RX ORDER — PROPOFOL 10 MG/ML
INJECTION, EMULSION INTRAVENOUS
Status: DISPENSED
Start: 2022-04-14

## (undated) RX ORDER — FENTANYL CITRATE 50 UG/ML
INJECTION, SOLUTION INTRAMUSCULAR; INTRAVENOUS
Status: DISPENSED
Start: 2022-04-14

## (undated) RX ORDER — OXYCODONE HYDROCHLORIDE 5 MG/1
TABLET ORAL
Status: DISPENSED
Start: 2022-04-14

## (undated) RX ORDER — FENTANYL CITRATE 50 UG/ML
INJECTION, SOLUTION INTRAMUSCULAR; INTRAVENOUS
Status: DISPENSED
Start: 2018-06-28

## (undated) RX ORDER — HYDROCODONE BITARTRATE AND ACETAMINOPHEN 5; 325 MG/1; MG/1
TABLET ORAL
Status: DISPENSED
Start: 2018-06-28

## (undated) RX ORDER — LIDOCAINE HYDROCHLORIDE 20 MG/ML
INJECTION, SOLUTION EPIDURAL; INFILTRATION; INTRACAUDAL; PERINEURAL
Status: DISPENSED
Start: 2018-06-28

## (undated) RX ORDER — PROPOFOL 10 MG/ML
INJECTION, EMULSION INTRAVENOUS
Status: DISPENSED
Start: 2018-06-28